# Patient Record
Sex: FEMALE | Race: BLACK OR AFRICAN AMERICAN | NOT HISPANIC OR LATINO | Employment: OTHER | URBAN - METROPOLITAN AREA
[De-identification: names, ages, dates, MRNs, and addresses within clinical notes are randomized per-mention and may not be internally consistent; named-entity substitution may affect disease eponyms.]

---

## 2023-01-26 PROBLEM — M75.22 BICEPS TENDINITIS OF LEFT UPPER EXTREMITY: Status: ACTIVE | Noted: 2023-01-26

## 2023-01-26 PROBLEM — G62.9 PERIPHERAL NEUROPATHY: Status: ACTIVE | Noted: 2023-01-26

## 2023-01-26 PROBLEM — J06.9 URI WITH COUGH AND CONGESTION: Status: ACTIVE | Noted: 2023-01-26

## 2023-01-26 PROBLEM — R26.89 IMBALANCE: Status: ACTIVE | Noted: 2023-01-26

## 2023-01-26 PROBLEM — I10 HTN (HYPERTENSION): Status: ACTIVE | Noted: 2023-01-26

## 2023-01-26 PROBLEM — I99.8 ISCHEMIC LEG: Status: ACTIVE | Noted: 2023-01-26

## 2023-01-26 PROBLEM — M70.62 TROCHANTERIC BURSITIS OF LEFT HIP: Status: ACTIVE | Noted: 2023-01-26

## 2023-01-26 PROBLEM — H25.813 COMBINED FORM OF AGE-RELATED CATARACT, BOTH EYES: Status: ACTIVE | Noted: 2023-01-26

## 2023-01-26 PROBLEM — A60.04 HERPES SIMPLEX VULVOVAGINITIS: Status: ACTIVE | Noted: 2023-01-26

## 2023-01-26 PROBLEM — J06.9 ACUTE URI: Status: ACTIVE | Noted: 2023-01-26

## 2023-01-26 PROBLEM — L84 PLANTAR CALLUS: Status: ACTIVE | Noted: 2023-01-26

## 2023-01-26 PROBLEM — N92.0 SPOTTING: Status: ACTIVE | Noted: 2023-01-26

## 2023-01-26 PROBLEM — N95.0 POST-MENOPAUSAL BLEEDING: Status: ACTIVE | Noted: 2023-01-26

## 2023-01-26 PROBLEM — I63.9 CEREBELLAR INFARCTION (MULTI): Status: ACTIVE | Noted: 2023-01-26

## 2023-01-26 PROBLEM — B35.1 ONYCHOMYCOSIS: Status: ACTIVE | Noted: 2023-01-26

## 2023-01-26 PROBLEM — M54.12 CERVICAL RADICULAR PAIN: Status: ACTIVE | Noted: 2023-01-26

## 2023-01-26 PROBLEM — E66.9 CLASS 1 OBESITY WITH SERIOUS COMORBIDITY AND BODY MASS INDEX (BMI) OF 33.0 TO 33.9 IN ADULT: Status: ACTIVE | Noted: 2023-01-26

## 2023-01-26 PROBLEM — E11.9 DIABETES MELLITUS TYPE 2 WITHOUT RETINOPATHY (MULTI): Status: ACTIVE | Noted: 2023-01-26

## 2023-01-26 PROBLEM — E78.5 HYPERLIPIDEMIA: Status: ACTIVE | Noted: 2023-01-26

## 2023-01-26 PROBLEM — H52.03 HYPERMETROPIA OF BOTH EYES: Status: ACTIVE | Noted: 2023-01-26

## 2023-01-26 PROBLEM — E11.42 DIABETIC PERIPHERAL NEUROPATHY (MULTI): Status: ACTIVE | Noted: 2023-01-26

## 2023-01-26 PROBLEM — R10.2 PELVIC CRAMPING: Status: ACTIVE | Noted: 2023-01-26

## 2023-01-26 PROBLEM — R63.4 WEIGHT LOSS, ABNORMAL: Status: ACTIVE | Noted: 2023-01-26

## 2023-01-26 PROBLEM — B96.89 BACTERIAL VAGINOSIS: Status: ACTIVE | Noted: 2023-01-26

## 2023-01-26 PROBLEM — E11.65 UNCONTROLLED TYPE 2 DIABETES MELLITUS WITH HYPERGLYCEMIA (MULTI): Status: ACTIVE | Noted: 2023-01-26

## 2023-01-26 PROBLEM — E66.811 CLASS 1 OBESITY WITH SERIOUS COMORBIDITY AND BODY MASS INDEX (BMI) OF 33.0 TO 33.9 IN ADULT: Status: ACTIVE | Noted: 2023-01-26

## 2023-01-26 PROBLEM — L84 PRE-ULCERATIVE CALLUSES: Status: ACTIVE | Noted: 2023-01-26

## 2023-01-26 PROBLEM — I73.9 PVD (PERIPHERAL VASCULAR DISEASE) (CMS-HCC): Status: ACTIVE | Noted: 2023-01-26

## 2023-01-26 PROBLEM — D73.5 SPLENIC INFARCTION: Status: ACTIVE | Noted: 2023-01-26

## 2023-01-26 PROBLEM — N76.0 BACTERIAL VAGINOSIS: Status: ACTIVE | Noted: 2023-01-26

## 2023-01-26 PROBLEM — K21.9 GERD (GASTROESOPHAGEAL REFLUX DISEASE): Status: ACTIVE | Noted: 2023-01-26

## 2023-01-26 PROBLEM — L60.3 NAIL DYSTROPHY: Status: ACTIVE | Noted: 2023-01-26

## 2023-01-26 PROBLEM — H52.203 ASTIGMATISM OF BOTH EYES: Status: ACTIVE | Noted: 2023-01-26

## 2023-01-26 RX ORDER — INSULIN GLARGINE 100 [IU]/ML
22 INJECTION, SOLUTION SUBCUTANEOUS NIGHTLY
COMMUNITY
End: 2023-11-02 | Stop reason: SDUPTHER

## 2023-01-26 RX ORDER — AMLODIPINE BESYLATE 10 MG/1
1 TABLET ORAL DAILY
COMMUNITY
Start: 2018-06-21 | End: 2023-09-14 | Stop reason: SDUPTHER

## 2023-01-26 RX ORDER — GLIPIZIDE 10 MG/1
1 TABLET ORAL 2 TIMES DAILY
COMMUNITY
Start: 2019-10-31 | End: 2023-03-14

## 2023-01-26 RX ORDER — DEXTROMETHORPHAN HYDROBROMIDE, GUAIFENESIN 5; 100 MG/5ML; MG/5ML
1 LIQUID ORAL 4 TIMES DAILY PRN
COMMUNITY
Start: 2020-09-17 | End: 2023-09-14 | Stop reason: ALTCHOICE

## 2023-01-26 RX ORDER — ATORVASTATIN CALCIUM 80 MG/1
1 TABLET, FILM COATED ORAL DAILY
COMMUNITY
Start: 2018-06-21 | End: 2024-02-22 | Stop reason: SDUPTHER

## 2023-01-26 RX ORDER — HYDRALAZINE HYDROCHLORIDE 50 MG/1
1 TABLET, FILM COATED ORAL EVERY 8 HOURS
COMMUNITY
Start: 2018-06-21 | End: 2024-02-08 | Stop reason: WASHOUT

## 2023-01-26 RX ORDER — PEN NEEDLE, DIABETIC 30 GX3/16"
NEEDLE, DISPOSABLE MISCELLANEOUS DAILY
COMMUNITY
End: 2024-02-08 | Stop reason: SDUPTHER

## 2023-01-26 RX ORDER — CLOPIDOGREL BISULFATE 75 MG/1
1 TABLET ORAL DAILY
COMMUNITY
Start: 2019-10-29 | End: 2023-08-18

## 2023-01-26 RX ORDER — LISINOPRIL 20 MG/1
1 TABLET ORAL DAILY
COMMUNITY
Start: 2018-06-21 | End: 2024-02-08 | Stop reason: SDUPTHER

## 2023-01-26 RX ORDER — CHLORTHALIDONE 25 MG/1
12.5 TABLET ORAL DAILY
COMMUNITY
Start: 2018-06-28 | End: 2023-06-02 | Stop reason: SDUPTHER

## 2023-01-26 RX ORDER — ASPIRIN 81 MG/1
1 TABLET ORAL DAILY
COMMUNITY

## 2023-03-14 DIAGNOSIS — E11.9 TYPE 2 DIABETES MELLITUS WITHOUT COMPLICATIONS (MULTI): ICD-10-CM

## 2023-03-14 RX ORDER — GLIPIZIDE 10 MG/1
TABLET ORAL
Qty: 60 TABLET | Refills: 1 | Status: SHIPPED | OUTPATIENT
Start: 2023-03-14 | End: 2023-06-04

## 2023-06-01 DIAGNOSIS — E11.9 TYPE 2 DIABETES MELLITUS WITHOUT COMPLICATIONS (MULTI): ICD-10-CM

## 2023-06-01 DIAGNOSIS — I10 HYPERTENSION, UNSPECIFIED TYPE: Primary | ICD-10-CM

## 2023-06-04 RX ORDER — CHLORTHALIDONE 25 MG/1
12.5 TABLET ORAL DAILY
Qty: 45 TABLET | Refills: 1 | Status: SHIPPED | OUTPATIENT
Start: 2023-06-04 | End: 2023-09-06

## 2023-06-04 RX ORDER — GLIPIZIDE 10 MG/1
TABLET ORAL
Qty: 60 TABLET | Refills: 1 | Status: SHIPPED | OUTPATIENT
Start: 2023-06-04 | End: 2023-08-18

## 2023-08-13 DIAGNOSIS — E11.9 TYPE 2 DIABETES MELLITUS WITHOUT COMPLICATIONS (MULTI): ICD-10-CM

## 2023-08-13 DIAGNOSIS — I63.9 CEREBRAL INFARCTION, UNSPECIFIED (MULTI): ICD-10-CM

## 2023-08-18 RX ORDER — CLOPIDOGREL BISULFATE 75 MG/1
75 TABLET ORAL DAILY
Qty: 90 TABLET | Refills: 0 | Status: SHIPPED | OUTPATIENT
Start: 2023-08-18 | End: 2023-11-10

## 2023-08-18 RX ORDER — GLIPIZIDE 10 MG/1
TABLET ORAL
Qty: 60 TABLET | Refills: 0 | Status: SHIPPED | OUTPATIENT
Start: 2023-08-18 | End: 2023-09-08

## 2023-09-05 DIAGNOSIS — I10 HYPERTENSION, UNSPECIFIED TYPE: ICD-10-CM

## 2023-09-06 RX ORDER — CHLORTHALIDONE 25 MG/1
12.5 TABLET ORAL DAILY
Qty: 30 TABLET | Refills: 0 | Status: SHIPPED | OUTPATIENT
Start: 2023-09-06 | End: 2023-10-09 | Stop reason: SDUPTHER

## 2023-09-08 DIAGNOSIS — E11.9 TYPE 2 DIABETES MELLITUS WITHOUT COMPLICATIONS (MULTI): ICD-10-CM

## 2023-09-08 RX ORDER — GLIPIZIDE 10 MG/1
TABLET ORAL
Qty: 60 TABLET | Refills: 0 | Status: SHIPPED | OUTPATIENT
Start: 2023-09-08 | End: 2023-10-09

## 2023-09-14 ENCOUNTER — LAB (OUTPATIENT)
Dept: LAB | Facility: LAB | Age: 68
End: 2023-09-14
Payer: MEDICARE

## 2023-09-14 ENCOUNTER — OFFICE VISIT (OUTPATIENT)
Dept: PRIMARY CARE | Facility: CLINIC | Age: 68
End: 2023-09-14
Payer: MEDICARE

## 2023-09-14 VITALS
TEMPERATURE: 98.1 F | WEIGHT: 179.4 LBS | HEART RATE: 67 BPM | RESPIRATION RATE: 17 BRPM | SYSTOLIC BLOOD PRESSURE: 140 MMHG | HEIGHT: 67 IN | DIASTOLIC BLOOD PRESSURE: 76 MMHG | OXYGEN SATURATION: 100 % | BODY MASS INDEX: 28.16 KG/M2

## 2023-09-14 DIAGNOSIS — I10 HYPERTENSION, UNSPECIFIED TYPE: ICD-10-CM

## 2023-09-14 DIAGNOSIS — E78.5 HYPERLIPIDEMIA, UNSPECIFIED HYPERLIPIDEMIA TYPE: ICD-10-CM

## 2023-09-14 DIAGNOSIS — N18.31 TYPE 2 DIABETES MELLITUS WITH STAGE 3A CHRONIC KIDNEY DISEASE, WITH LONG-TERM CURRENT USE OF INSULIN (MULTI): Chronic | ICD-10-CM

## 2023-09-14 DIAGNOSIS — E78.5 HYPERLIPIDEMIA, UNSPECIFIED HYPERLIPIDEMIA TYPE: Primary | ICD-10-CM

## 2023-09-14 DIAGNOSIS — Z79.4 TYPE 2 DIABETES MELLITUS WITH STAGE 3A CHRONIC KIDNEY DISEASE, WITH LONG-TERM CURRENT USE OF INSULIN (MULTI): Chronic | ICD-10-CM

## 2023-09-14 DIAGNOSIS — E11.22 TYPE 2 DIABETES MELLITUS WITH STAGE 3A CHRONIC KIDNEY DISEASE, WITH LONG-TERM CURRENT USE OF INSULIN (MULTI): Chronic | ICD-10-CM

## 2023-09-14 PROBLEM — D48.5 NEOPLASM OF UNCERTAIN BEHAVIOR OF SKIN: Status: ACTIVE | Noted: 2022-02-17

## 2023-09-14 PROBLEM — L85.3 XEROSIS CUTIS: Status: ACTIVE | Noted: 2022-02-17

## 2023-09-14 PROBLEM — L85.9 EPIDERMAL THICKENING, UNSPECIFIED: Status: ACTIVE | Noted: 2022-02-17

## 2023-09-14 LAB
ALBUMIN (MG/L) IN URINE: 17.4 MG/L
ALBUMIN/CREATININE (UG/MG) IN URINE: 26.7 UG/MG CRT (ref 0–30)
ANION GAP IN SER/PLAS: 13 MMOL/L (ref 10–20)
CALCIUM (MG/DL) IN SER/PLAS: 9.5 MG/DL (ref 8.6–10.6)
CARBON DIOXIDE, TOTAL (MMOL/L) IN SER/PLAS: 29 MMOL/L (ref 21–32)
CHLORIDE (MMOL/L) IN SER/PLAS: 105 MMOL/L (ref 98–107)
CHOLESTEROL (MG/DL) IN SER/PLAS: 179 MG/DL (ref 0–199)
CHOLESTEROL IN HDL (MG/DL) IN SER/PLAS: 51.5 MG/DL
CHOLESTEROL IN LDL (MG/DL) IN SER/PLAS BY DIRECT ASSAY: 114 MG/DL (ref 0–129)
CHOLESTEROL/HDL RATIO: 3.5
CREATININE (MG/DL) IN SER/PLAS: 1.12 MG/DL (ref 0.5–1.05)
CREATININE (MG/DL) IN URINE: 65.2 MG/DL (ref 20–320)
ESTIMATED AVERAGE GLUCOSE FOR HBA1C: 226 MG/DL
GFR FEMALE: 54 ML/MIN/1.73M2
GLUCOSE (MG/DL) IN SER/PLAS: 256 MG/DL (ref 74–99)
HEMOGLOBIN A1C/HEMOGLOBIN TOTAL IN BLOOD: 9.5 %
NON-HDL CHOLESTEROL: 128 MG/DL
POTASSIUM (MMOL/L) IN SER/PLAS: 3.9 MMOL/L (ref 3.5–5.3)
SODIUM (MMOL/L) IN SER/PLAS: 143 MMOL/L (ref 136–145)
UREA NITROGEN (MG/DL) IN SER/PLAS: 31 MG/DL (ref 6–23)

## 2023-09-14 PROCEDURE — 82465 ASSAY BLD/SERUM CHOLESTEROL: CPT

## 2023-09-14 PROCEDURE — 4010F ACE/ARB THERAPY RXD/TAKEN: CPT | Performed by: INTERNAL MEDICINE

## 2023-09-14 PROCEDURE — 82043 UR ALBUMIN QUANTITATIVE: CPT

## 2023-09-14 PROCEDURE — 36415 COLL VENOUS BLD VENIPUNCTURE: CPT

## 2023-09-14 PROCEDURE — 80048 BASIC METABOLIC PNL TOTAL CA: CPT

## 2023-09-14 PROCEDURE — 3078F DIAST BP <80 MM HG: CPT | Performed by: INTERNAL MEDICINE

## 2023-09-14 PROCEDURE — 83036 HEMOGLOBIN GLYCOSYLATED A1C: CPT

## 2023-09-14 PROCEDURE — 3077F SYST BP >= 140 MM HG: CPT | Performed by: INTERNAL MEDICINE

## 2023-09-14 PROCEDURE — 83721 ASSAY OF BLOOD LIPOPROTEIN: CPT

## 2023-09-14 PROCEDURE — 82570 ASSAY OF URINE CREATININE: CPT

## 2023-09-14 PROCEDURE — 99214 OFFICE O/P EST MOD 30 MIN: CPT | Performed by: INTERNAL MEDICINE

## 2023-09-14 PROCEDURE — 1159F MED LIST DOCD IN RCRD: CPT | Performed by: INTERNAL MEDICINE

## 2023-09-14 PROCEDURE — 1036F TOBACCO NON-USER: CPT | Performed by: INTERNAL MEDICINE

## 2023-09-14 PROCEDURE — 83718 ASSAY OF LIPOPROTEIN: CPT

## 2023-09-14 PROCEDURE — 1160F RVW MEDS BY RX/DR IN RCRD: CPT | Performed by: INTERNAL MEDICINE

## 2023-09-14 PROCEDURE — 1126F AMNT PAIN NOTED NONE PRSNT: CPT | Performed by: INTERNAL MEDICINE

## 2023-09-14 RX ORDER — LISINOPRIL AND HYDROCHLOROTHIAZIDE 20; 25 MG/1; MG/1
2 TABLET ORAL DAILY
COMMUNITY
Start: 2015-06-19 | End: 2023-09-14 | Stop reason: ALTCHOICE

## 2023-09-14 RX ORDER — AMLODIPINE BESYLATE 10 MG/1
10 TABLET ORAL DAILY
Qty: 90 TABLET | Refills: 0 | Status: SHIPPED | OUTPATIENT
Start: 2023-09-14 | End: 2024-01-02

## 2023-09-14 RX ORDER — AMLODIPINE BESYLATE 10 MG/1
10 TABLET ORAL DAILY
Qty: 90 TABLET | Refills: 0 | Status: CANCELLED | OUTPATIENT
Start: 2023-09-14 | End: 2023-12-13

## 2023-09-14 ASSESSMENT — PATIENT HEALTH QUESTIONNAIRE - PHQ9
2. FEELING DOWN, DEPRESSED OR HOPELESS: NOT AT ALL
1. LITTLE INTEREST OR PLEASURE IN DOING THINGS: NOT AT ALL
SUM OF ALL RESPONSES TO PHQ9 QUESTIONS 1 & 2: 0

## 2023-09-14 ASSESSMENT — LIFESTYLE VARIABLES
AUDIT-C TOTAL SCORE: 0
HOW OFTEN DO YOU HAVE SIX OR MORE DRINKS ON ONE OCCASION: NEVER
HOW MANY STANDARD DRINKS CONTAINING ALCOHOL DO YOU HAVE ON A TYPICAL DAY: PATIENT DOES NOT DRINK
SKIP TO QUESTIONS 9-10: 1
HOW OFTEN DO YOU HAVE A DRINK CONTAINING ALCOHOL: NEVER

## 2023-09-14 ASSESSMENT — ENCOUNTER SYMPTOMS
SHORTNESS OF BREATH: 0
RHINORRHEA: 0
COUGH: 0
DIZZINESS: 0
DYSURIA: 0
NAUSEA: 0
FEVER: 0
EYES NEGATIVE: 1
HEADACHES: 0
VOMITING: 0
CHILLS: 0
NUMBNESS: 0
ABDOMINAL PAIN: 0
POLYPHAGIA: 0
DIARRHEA: 0
WHEEZING: 0
SORE THROAT: 0
POLYDIPSIA: 0

## 2023-09-14 NOTE — PATIENT INSTRUCTIONS
Assessment/Plan     Tammy was seen today for diabetes.  Diagnoses and all orders for this visit:  Hyperlipidemia, unspecified hyperlipidemia type (Primary)  Comments:  Most recent LDL cholesterol was 119mg/dl. Goal is LDL cholesterol<100mg/dl.  Plan: Continue statin [place]  -Low cholesterol diet  Orders:  -     Cholesterol, LDL Direct; Future  -     Lipid Panel Non-Fasting; Future  Hypertension, unspecified type  Comments:  BP today is 140/76. Goal is BP<130/80.Has been without Amlodipine for the past 4 days.   Plan: take all medeications as previously prescribed  Orders:  -     Basic Metabolic Panel; Future  -     amLODIPine (Norvasc) 10 mg tablet; Take 1 tablet (10 mg) by mouth once daily.  Type 2 diabetes mellitus with stage 3a chronic kidney disease, with long-term current use of insulin (CMS/[facility])  Comments:  Non-compliance treatment.  Has not taken insulin in >two months.  plan: Resume inulin as prescribed  -Take all other medications as prescribed  Orders:  -     Albumin , Urine Random; Future  -     Basic Metabolic Panel; Future  -     Hemoglobin A1C; Future  -     Follow Up In [facility] - Pharmacy; Future     F/U in 2mths for DM and HTN

## 2023-09-14 NOTE — PROGRESS NOTES
Subjective   Patient ID: Tammy Becerra is a 68 y.o. female who presents for Diabetes.  The patient is a 68-year-old female who is being seen today for hypertension, diabetes and hyperlipidemia.  She states that she has not taken her insulin in the past two months.  She does have the insulin at home but she decided not to take it.  She states that her fasting blood sugar has been elevated at x2 in the 200s and occasionally her random blood sugar has been in the 300s.  She has been noncompliant with both her medications and her diet.        Review of Systems   Constitutional:  Negative for chills and fever.   HENT:  Negative for rhinorrhea and sore throat.    Eyes: Negative.    Respiratory:  Negative for cough, shortness of breath and wheezing.    Cardiovascular:  Negative for chest pain and leg swelling.   Gastrointestinal:  Negative for abdominal pain, diarrhea, nausea and vomiting.   Endocrine: Negative for polydipsia, polyphagia and polyuria.   Genitourinary:  Negative for dysuria.   Neurological:  Negative for dizziness, syncope, numbness and headaches.       Objective   Physical Exam  Vitals reviewed.   Constitutional:       General: She is not in acute distress.     Appearance: She is not ill-appearing.   Cardiovascular:      Rate and Rhythm: Normal rate and regular rhythm.      Heart sounds: Normal heart sounds.   Pulmonary:      Effort: Pulmonary effort is normal.      Breath sounds: Normal breath sounds.   Abdominal:      General: Bowel sounds are normal.      Palpations: Abdomen is soft.   Musculoskeletal:      Cervical back: No tenderness.      Right lower leg: No edema.      Left lower leg: No edema.   Lymphadenopathy:      Cervical: No cervical adenopathy.   Skin:     General: Skin is warm and dry.   Neurological:      Mental Status: She is alert and oriented to person, place, and time.      Sensory: No sensory deficit.   Psychiatric:         Mood and Affect: Mood normal.         Assessment/Plan      Tammy was seen today for diabetes.  Diagnoses and all orders for this visit:  Hyperlipidemia, unspecified hyperlipidemia type (Primary)  Comments:  Most recent LDL cholesterol was 119mg/dl. Goal is LDL cholesterol<100mg/dl.  Plan: Continue statin [place]  -Low cholesterol diet  Orders:  -     Cholesterol, LDL Direct; Future  -     Lipid Panel Non-Fasting; Future  Hypertension, unspecified type  Comments:  BP today is 140/76. Goal is BP<130/80.Has been without Amlodipine for the past 4 days.   Plan: take all medeications as previously prescribed  Orders:  -     Basic Metabolic Panel; Future  -     amLODIPine (Norvasc) 10 mg tablet; Take 1 tablet (10 mg) by mouth once daily.  Type 2 diabetes mellitus with stage 3a chronic kidney disease, with long-term current use of insulin (CMS/[facility])  Comments:  Non-compliance treatment.  Has not taken insulin in >two months.  plan: Resume inulin as prescribed  -Take all other medications as prescribed  Orders:  -     Albumin , Urine Random; Future  -     Basic Metabolic Panel; Future  -     Hemoglobin A1C; Future  -     Follow Up In [facility] Pharmacy; Future     F/U in 2mths for DM and HTN

## 2023-10-08 DIAGNOSIS — I10 HYPERTENSION, UNSPECIFIED TYPE: ICD-10-CM

## 2023-10-08 DIAGNOSIS — E11.9 TYPE 2 DIABETES MELLITUS WITHOUT COMPLICATIONS (MULTI): ICD-10-CM

## 2023-10-09 RX ORDER — CHLORTHALIDONE 25 MG/1
12.5 TABLET ORAL DAILY
Qty: 45 TABLET | Refills: 0 | Status: SHIPPED | OUTPATIENT
Start: 2023-10-09 | End: 2023-11-10

## 2023-10-09 RX ORDER — GLIPIZIDE 10 MG/1
TABLET ORAL
Qty: 60 TABLET | Refills: 0 | Status: SHIPPED | OUTPATIENT
Start: 2023-10-09 | End: 2023-11-10

## 2023-10-10 ENCOUNTER — TELEPHONE (OUTPATIENT)
Dept: PHARMACY | Facility: HOSPITAL | Age: 68
End: 2023-10-10

## 2023-10-10 NOTE — TELEPHONE ENCOUNTER
Population Health: Outreach by Ambulatory Pharmacy Team    Payor: DORIAN JOHNSON  Reason: Adherence  Medication: Lisinopril 20 mg, Glipizide 10 mg  Outcome: No Answer/Invalid Number Left message with callback number.    BETHANY GOMEZ CPhT

## 2023-10-17 ENCOUNTER — TELEMEDICINE (OUTPATIENT)
Dept: PHARMACY | Facility: HOSPITAL | Age: 68
End: 2023-10-17
Payer: MEDICARE

## 2023-10-17 DIAGNOSIS — N18.31 TYPE 2 DIABETES MELLITUS WITH STAGE 3A CHRONIC KIDNEY DISEASE, WITH LONG-TERM CURRENT USE OF INSULIN (MULTI): Chronic | ICD-10-CM

## 2023-10-17 DIAGNOSIS — E11.22 TYPE 2 DIABETES MELLITUS WITH STAGE 3A CHRONIC KIDNEY DISEASE, WITH LONG-TERM CURRENT USE OF INSULIN (MULTI): Chronic | ICD-10-CM

## 2023-10-17 DIAGNOSIS — Z79.4 TYPE 2 DIABETES MELLITUS WITH STAGE 3A CHRONIC KIDNEY DISEASE, WITH LONG-TERM CURRENT USE OF INSULIN (MULTI): Chronic | ICD-10-CM

## 2023-10-17 ASSESSMENT — ENCOUNTER SYMPTOMS
HEADACHES: 1
DIZZINESS: 1
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
## 2023-10-17 NOTE — ASSESSMENT & PLAN NOTE
ASSESSMENT:  -Patient's most recent A1c is uncontrolled at 9.5%; above goal of <7.5% due to age  -Discussed that patient's sugars are elevated in the 200s consistently which can lead to feeling hypoglycemic once sugars are <200, even if this is not a true low  -Due to patient's acid reflux, would not be ideal to add on medications that can upset the stomach at this time (i.e metformin, GLP1-Jenna). Discussed trying medications OTC such as a PPI and/or Pepcid in the mornings.  -Patient has a history of non-compliance and recently restarted her insulin after being without for >2 months. Stressed importance of compliance to get blood sugars better under control. Unwilling to try other medications at this time.     RECOMMENDATIONS/PLAN:    INCREASE Lantus to 25 units at bedtime.   CONTINUE Glipizide 10 mg BID.   Advised patient to try Prilosec or Nexium OTC in the mornings on an empty stomach to see if this helps with her acid reflux.     FUTURE CONSIDERATIONS:    Can see if consolidation with BP medications helps with compliance  Switching Lisinopril & Chlorthalidone to Lisinopril/Hydrochlorothiazide combination pill  If diabetes medication is added, can consider SGLT2 inhibitor to replace Glipizide as this does not upset stomach and can also help with hypertension  Can also consider combining this with Metformin using combination pill Synjardy if patient is willing

## 2023-10-18 ENCOUNTER — APPOINTMENT (OUTPATIENT)
Dept: PRIMARY CARE | Facility: CLINIC | Age: 68
End: 2023-10-18
Payer: MEDICARE

## 2023-10-25 ENCOUNTER — OFFICE VISIT (OUTPATIENT)
Dept: VASCULAR SURGERY | Facility: CLINIC | Age: 68
End: 2023-10-25
Payer: MEDICARE

## 2023-10-25 ENCOUNTER — HOSPITAL ENCOUNTER (OUTPATIENT)
Dept: VASCULAR MEDICINE | Facility: CLINIC | Age: 68
Discharge: HOME | End: 2023-10-25
Payer: MEDICARE

## 2023-10-25 VITALS
DIASTOLIC BLOOD PRESSURE: 90 MMHG | WEIGHT: 180.6 LBS | HEART RATE: 94 BPM | SYSTOLIC BLOOD PRESSURE: 170 MMHG | BODY MASS INDEX: 28.35 KG/M2 | HEIGHT: 67 IN

## 2023-10-25 DIAGNOSIS — I73.9 PVD (PERIPHERAL VASCULAR DISEASE) (CMS-HCC): ICD-10-CM

## 2023-10-25 DIAGNOSIS — I73.89 OTHER SPECIFIED PERIPHERAL VASCULAR DISEASES (CMS-HCC): ICD-10-CM

## 2023-10-25 PROCEDURE — 93922 UPR/L XTREMITY ART 2 LEVELS: CPT | Performed by: SURGERY

## 2023-10-25 PROCEDURE — 93922 UPR/L XTREMITY ART 2 LEVELS: CPT

## 2023-11-02 DIAGNOSIS — E11.9 DIABETES MELLITUS TYPE 2 WITHOUT RETINOPATHY (MULTI): ICD-10-CM

## 2023-11-02 RX ORDER — INSULIN GLARGINE 100 [IU]/ML
22 INJECTION, SOLUTION SUBCUTANEOUS NIGHTLY
Qty: 10 ML | Refills: 0 | Status: SHIPPED | OUTPATIENT
Start: 2023-11-02 | End: 2023-11-03 | Stop reason: ENTERED-IN-ERROR

## 2023-11-03 DIAGNOSIS — E11.9 DIABETES MELLITUS TYPE 2 WITHOUT RETINOPATHY (MULTI): ICD-10-CM

## 2023-11-03 RX ORDER — INSULIN GLARGINE 100 [IU]/ML
INJECTION, SOLUTION SUBCUTANEOUS
Qty: 3 EACH | Refills: 0 | Status: SHIPPED | OUTPATIENT
Start: 2023-11-03 | End: 2024-01-04 | Stop reason: SDUPTHER

## 2023-11-03 RX ORDER — INSULIN GLARGINE 100 [IU]/ML
INJECTION, SOLUTION SUBCUTANEOUS
COMMUNITY
Start: 2023-10-18 | End: 2023-11-03 | Stop reason: SDUPTHER

## 2023-11-03 NOTE — TELEPHONE ENCOUNTER
Patient called in to the office to ask about lantus. The insulin that was sent to the pharmacy was incorrect. This is the correct Lantus pen that needs to be sent to the pharmacy.

## 2023-11-09 DIAGNOSIS — E11.9 TYPE 2 DIABETES MELLITUS WITHOUT COMPLICATIONS (MULTI): ICD-10-CM

## 2023-11-09 DIAGNOSIS — I63.9 CEREBRAL INFARCTION, UNSPECIFIED (MULTI): ICD-10-CM

## 2023-11-09 DIAGNOSIS — I10 HYPERTENSION, UNSPECIFIED TYPE: ICD-10-CM

## 2023-11-10 RX ORDER — CHLORTHALIDONE 25 MG/1
12.5 TABLET ORAL DAILY
Qty: 45 TABLET | Refills: 0 | Status: SHIPPED | OUTPATIENT
Start: 2023-11-10 | End: 2024-02-08 | Stop reason: SDUPTHER

## 2023-11-10 RX ORDER — CLOPIDOGREL BISULFATE 75 MG/1
75 TABLET ORAL DAILY
Qty: 90 TABLET | Refills: 0 | Status: SHIPPED | OUTPATIENT
Start: 2023-11-10

## 2023-11-10 RX ORDER — GLIPIZIDE 10 MG/1
TABLET ORAL
Qty: 60 TABLET | Refills: 0 | Status: SHIPPED | OUTPATIENT
Start: 2023-11-10 | End: 2023-12-29

## 2023-11-17 ENCOUNTER — TELEMEDICINE (OUTPATIENT)
Dept: PHARMACY | Facility: HOSPITAL | Age: 68
End: 2023-11-17
Payer: MEDICARE

## 2023-11-17 DIAGNOSIS — E11.9 DIABETES MELLITUS TYPE 2 WITHOUT RETINOPATHY (MULTI): ICD-10-CM

## 2023-11-17 DIAGNOSIS — E11.9 DIABETES MELLITUS TYPE 2 WITHOUT RETINOPATHY (MULTI): Primary | ICD-10-CM

## 2023-11-17 NOTE — PROGRESS NOTES
Subjective   Patient ID: Tammy Becerra is a 68 y.o. female who presents for Diabetes.    Referring Provider: Lorraine Piña MD     Patient presents to Clinical Pharmacy team for follow up after last visit on 10/17/23. Patient saw PCP back in September and was restarted on her Lantus after not taking it for >2 months. Patient's A1c during office visit was 9.5%, which was decreased from last A1c back in December of last year at 10.6%.      Patient usually measures her blood sugars in the mornings between 9-10 AM and in the evening between 7-8 PM. Reports BG in the mornings being around the 200s; these are not fasting readings as patient usually has some crackers and tea (without sugar) in the morning due to her acid reflux. Patient described being unable to do anything in the mornings for ~3 hours due to her acid reflux, which started after her stroke in 2019.       BG in the evenings are usually lower per patient, as she does not each much during the day-reports around the mid 100s. Denies any recent readings <100 mg/dL, though notes that she has hypoglycemia symptoms (dizziness/headaches) when her BG drops below 200.      Of note, patient is overwhelmed with current number of medications and is non-compliant as a result. Believes that her acid reflux makes this worse as it is hard to keep pills down until it resolves. Is unwilling to add on more medications at this time.     HISTORICAL MEDICATIONS:  -Metformin (stopped d/t GI issues)  -Trulicity (stopped d/t GI issues)      Objective     There were no vitals taken for this visit.     Labs  Lab Results   Component Value Date    BILITOT 0.7 04/25/2022    CALCIUM 9.5 09/14/2023    CO2 29 09/14/2023     09/14/2023    CREATININE 1.12 (H) 09/14/2023    GLUCOSE 256 (H) 09/14/2023    ALKPHOS 99 04/25/2022    K 3.9 09/14/2023    PROT 8.4 (H) 04/25/2022     09/14/2023    AST 13 04/25/2022    ALT 10 04/25/2022    BUN 31 (H) 09/14/2023    ANIONGAP 13 09/14/2023     "MG 1.90 04/26/2022    PHOS 3.6 12/11/2019     10/01/2018    ALBUMIN 4.7 04/25/2022    LIPASE 28 04/25/2022    GFRF 54 (A) 09/14/2023     Lab Results   Component Value Date    TRIG 196 (H) 04/26/2022    CHOL 179 09/14/2023    HDL 51.5 09/14/2023     Lab Results   Component Value Date    HGBA1C 9.5 (A) 09/14/2023       Current Outpatient Medications on File Prior to Visit   Medication Sig Dispense Refill    amLODIPine (Norvasc) 10 mg tablet Take 1 tablet (10 mg) by mouth once daily. 90 tablet 0    aspirin 81 mg EC tablet Take 1 tablet (81 mg) by mouth once daily.      atorvastatin (Lipitor) 80 mg tablet Take 1 tablet (80 mg) by mouth once daily.      chlorthalidone (Hygroton) 25 mg tablet TAKE 1/2 (ONE-HALF) OF A TABLET BY MOUTH ONCE DAILY 45 tablet 0    clopidogrel (Plavix) 75 mg tablet TAKE 1 TABLET BY MOUTH ONCE DAILY 90 tablet 0    glipiZIDE (Glucotrol) 10 mg tablet TAKE 1 TABLET BY MOUTH TWICE DAILY 60 tablet 0    hydrALAZINE (Apresoline) 50 mg tablet Take 1 tablet (50 mg) by mouth every 8 hours.      Lantus Solostar U-100 Insulin 100 unit/mL (3 mL) pen Use 22units nightly 3 each 0    lisinopril 20 mg tablet Take 1 tablet (20 mg) by mouth once daily.      pen needle, diabetic 32 gauge x 5/32\" needle 1 (one) time each day.       No current facility-administered medications on file prior to visit.        Assessment/Plan   Problem List Items Addressed This Visit             ICD-10-CM    Diabetes mellitus type 2 without retinopathy (CMS/[facility]) E11.9     ASSESSMENT:  -Patient's most recent A1c is uncontrolled at 9.5%; above goal of <7.5% due to age  -Discussed that patient's sugars are elevated in the 200s consistently which can lead to feeling hypoglycemic once sugars are <200, even if this is not a true low  -Due to patient's acid reflux, would not be ideal to add on medications that can upset the stomach at this time (i.e metformin, GLP1-Jenna). Discussed trying medications OTC such as a PPI and/or Pepcid in the " mornings.  -Patient has a history of non-compliance and recently restarted her insulin after being without for >2 months. Stressed importance of compliance to get blood sugars better under control. Unwilling to try other medications at this time.      RECOMMENDATIONS/PLAN:     INCREASE Lantus to 25 units at bedtime. Was unable to increase after last visit with pharmacy team.  CONTINUE Glipizide 10 mg BID.   Advised patient to try Prilosec or Nexium OTC in the mornings on an empty stomach to see if this helps with her acid reflux.   Patient was not feeling well at time of appointment and discussion was cut short. Will discuss medication options with patient in more depth next week.      FUTURE CONSIDERATIONS:     Can see if consolidation with BP medications helps with compliance  Switching Lisinopril & Chlorthalidone to Lisinopril/Hydrochlorothiazide combination pill  If diabetes medication is added, can consider SGLT2 inhibitor to replace Glipizide as this does not upset stomach and can also help with hypertension  Can also consider combining this with Metformin using combination pill Synjardy if patient is willing          Medina Murphy PharmD  Clinical Ambulatory Care Pharmacist  Ph: [phone]    Continue all meds under the continuation of care with the referring provider and clinical pharmacy team.    Clinical Pharmacist follow up: 11/21/23 or sooner as needed based on clinical intervention  Type of Encounter:  Telephone    Verbal consent to manage patient's drug therapy was obtained from the patient. They were informed they may decline to participate or withdraw from participation in pharmacy services at any time.

## 2023-11-17 NOTE — ASSESSMENT & PLAN NOTE
ASSESSMENT:  -Patient's most recent A1c is uncontrolled at 9.5%; above goal of <7.5% due to age  -Discussed that patient's sugars are elevated in the 200s consistently which can lead to feeling hypoglycemic once sugars are <200, even if this is not a true low  -Due to patient's acid reflux, would not be ideal to add on medications that can upset the stomach at this time (i.e metformin, GLP1-Jenna). Discussed trying medications OTC such as a PPI and/or Pepcid in the mornings.  -Patient has a history of non-compliance and recently restarted her insulin after being without for >2 months. Stressed importance of compliance to get blood sugars better under control. Unwilling to try other medications at this time.      RECOMMENDATIONS/PLAN:     INCREASE Lantus to 25 units at bedtime. Was unable to increase after last visit with pharmacy team.  CONTINUE Glipizide 10 mg BID.   Advised patient to try Prilosec or Nexium OTC in the mornings on an empty stomach to see if this helps with her acid reflux.   Patient was not feeling well at time of appointment and discussion was cut short. Will discuss medication options with patient in more depth next week.      FUTURE CONSIDERATIONS:     Can see if consolidation with BP medications helps with compliance  Switching Lisinopril & Chlorthalidone to Lisinopril/Hydrochlorothiazide combination pill  If diabetes medication is added, can consider SGLT2 inhibitor to replace Glipizide as this does not upset stomach and can also help with hypertension  Can also consider combining this with Metformin using combination pill Synjardy if patient is willing

## 2023-12-19 DIAGNOSIS — I10 HYPERTENSION, UNSPECIFIED TYPE: ICD-10-CM

## 2023-12-29 NOTE — TELEPHONE ENCOUNTER
Patient called in I explained to her about her medication and that the Rx refill needs to be signed off by provider but she needs to get a PCP in the mean time

## 2024-01-02 RX ORDER — AMLODIPINE BESYLATE 10 MG/1
10 TABLET ORAL DAILY
Qty: 90 TABLET | Refills: 0 | Status: SHIPPED | OUTPATIENT
Start: 2024-01-02 | End: 2024-02-05

## 2024-01-04 DIAGNOSIS — E11.9 DIABETES MELLITUS TYPE 2 WITHOUT RETINOPATHY (MULTI): ICD-10-CM

## 2024-01-05 DIAGNOSIS — E11.9 TYPE 2 DIABETES MELLITUS WITHOUT COMPLICATIONS (MULTI): ICD-10-CM

## 2024-01-10 ENCOUNTER — APPOINTMENT (OUTPATIENT)
Dept: VASCULAR SURGERY | Facility: CLINIC | Age: 69
End: 2024-01-10
Payer: MEDICARE

## 2024-01-10 RX ORDER — INSULIN GLARGINE 100 [IU]/ML
25 INJECTION, SOLUTION SUBCUTANEOUS NIGHTLY
Qty: 9 ML | Refills: 0 | Status: SHIPPED | OUTPATIENT
Start: 2024-01-10 | End: 2024-01-31 | Stop reason: SDUPTHER

## 2024-01-24 RX ORDER — GLIPIZIDE 10 MG/1
TABLET ORAL
Qty: 60 TABLET | Refills: 0 | Status: SHIPPED | OUTPATIENT
Start: 2024-01-24 | End: 2024-03-05

## 2024-01-31 DIAGNOSIS — E11.9 DIABETES MELLITUS TYPE 2 WITHOUT RETINOPATHY (MULTI): ICD-10-CM

## 2024-02-01 RX ORDER — INSULIN GLARGINE 100 [IU]/ML
25 INJECTION, SOLUTION SUBCUTANEOUS NIGHTLY
Qty: 9 ML | Refills: 0 | Status: SHIPPED | OUTPATIENT
Start: 2024-02-01 | End: 2024-02-22 | Stop reason: SDUPTHER

## 2024-02-04 DIAGNOSIS — I10 HYPERTENSION, UNSPECIFIED TYPE: ICD-10-CM

## 2024-02-05 RX ORDER — AMLODIPINE BESYLATE 10 MG/1
10 TABLET ORAL DAILY
Qty: 90 TABLET | Refills: 0 | Status: SHIPPED | OUTPATIENT
Start: 2024-02-05 | End: 2024-02-08 | Stop reason: SDUPTHER

## 2024-02-06 DIAGNOSIS — E11.9 TYPE 2 DIABETES MELLITUS WITHOUT COMPLICATIONS (MULTI): ICD-10-CM

## 2024-02-06 RX ORDER — GLIPIZIDE 10 MG/1
TABLET ORAL
Qty: 60 TABLET | Refills: 0 | OUTPATIENT
Start: 2024-02-06

## 2024-02-08 ENCOUNTER — APPOINTMENT (OUTPATIENT)
Dept: LAB | Facility: LAB | Age: 69
End: 2024-02-08
Payer: MEDICARE

## 2024-02-08 ENCOUNTER — OFFICE VISIT (OUTPATIENT)
Dept: PRIMARY CARE | Facility: CLINIC | Age: 69
End: 2024-02-08
Payer: MEDICARE

## 2024-02-08 ENCOUNTER — APPOINTMENT (OUTPATIENT)
Dept: PRIMARY CARE | Facility: CLINIC | Age: 69
End: 2024-02-08
Payer: MEDICARE

## 2024-02-08 VITALS
BODY MASS INDEX: 28.82 KG/M2 | HEART RATE: 82 BPM | DIASTOLIC BLOOD PRESSURE: 90 MMHG | SYSTOLIC BLOOD PRESSURE: 184 MMHG | OXYGEN SATURATION: 98 % | WEIGHT: 184 LBS

## 2024-02-08 DIAGNOSIS — I10 HYPERTENSION, UNSPECIFIED TYPE: ICD-10-CM

## 2024-02-08 DIAGNOSIS — Z12.31 ENCOUNTER FOR SCREENING MAMMOGRAM FOR MALIGNANT NEOPLASM OF BREAST: ICD-10-CM

## 2024-02-08 DIAGNOSIS — E11.9 DIABETES MELLITUS TYPE 2 WITHOUT RETINOPATHY (MULTI): Primary | ICD-10-CM

## 2024-02-08 DIAGNOSIS — Z78.0 MENOPAUSE: ICD-10-CM

## 2024-02-08 PROCEDURE — 4010F ACE/ARB THERAPY RXD/TAKEN: CPT | Performed by: STUDENT IN AN ORGANIZED HEALTH CARE EDUCATION/TRAINING PROGRAM

## 2024-02-08 PROCEDURE — 3050F LDL-C >= 130 MG/DL: CPT | Performed by: STUDENT IN AN ORGANIZED HEALTH CARE EDUCATION/TRAINING PROGRAM

## 2024-02-08 PROCEDURE — 3052F HG A1C>EQUAL 8.0%<EQUAL 9.0%: CPT | Performed by: STUDENT IN AN ORGANIZED HEALTH CARE EDUCATION/TRAINING PROGRAM

## 2024-02-08 PROCEDURE — 3077F SYST BP >= 140 MM HG: CPT | Performed by: STUDENT IN AN ORGANIZED HEALTH CARE EDUCATION/TRAINING PROGRAM

## 2024-02-08 PROCEDURE — 1036F TOBACCO NON-USER: CPT | Performed by: STUDENT IN AN ORGANIZED HEALTH CARE EDUCATION/TRAINING PROGRAM

## 2024-02-08 PROCEDURE — 99204 OFFICE O/P NEW MOD 45 MIN: CPT | Performed by: STUDENT IN AN ORGANIZED HEALTH CARE EDUCATION/TRAINING PROGRAM

## 2024-02-08 PROCEDURE — 3080F DIAST BP >= 90 MM HG: CPT | Performed by: STUDENT IN AN ORGANIZED HEALTH CARE EDUCATION/TRAINING PROGRAM

## 2024-02-08 PROCEDURE — 1126F AMNT PAIN NOTED NONE PRSNT: CPT | Performed by: STUDENT IN AN ORGANIZED HEALTH CARE EDUCATION/TRAINING PROGRAM

## 2024-02-08 RX ORDER — AMLODIPINE BESYLATE 10 MG/1
10 TABLET ORAL DAILY
Qty: 90 TABLET | Refills: 3 | Status: SHIPPED | OUTPATIENT
Start: 2024-02-08

## 2024-02-08 RX ORDER — LISINOPRIL 40 MG/1
40 TABLET ORAL DAILY
Qty: 90 TABLET | Refills: 3 | Status: SHIPPED | OUTPATIENT
Start: 2024-02-08 | End: 2025-02-07

## 2024-02-08 RX ORDER — BLOOD SUGAR DIAGNOSTIC
1 STRIP MISCELLANEOUS 3 TIMES DAILY
Qty: 100 EACH | Refills: 11 | Status: SHIPPED | OUTPATIENT
Start: 2024-02-08 | End: 2025-03-14

## 2024-02-08 RX ORDER — LISINOPRIL 20 MG/1
40 TABLET ORAL DAILY
Qty: 180 TABLET | Refills: 3 | Status: SHIPPED | OUTPATIENT
Start: 2024-02-08 | End: 2024-02-08 | Stop reason: SDUPTHER

## 2024-02-08 RX ORDER — CHLORTHALIDONE 25 MG/1
12.5 TABLET ORAL DAILY
Qty: 45 TABLET | Refills: 0 | Status: SHIPPED | OUTPATIENT
Start: 2024-02-08 | End: 2024-03-28 | Stop reason: SDUPTHER

## 2024-02-08 NOTE — PATIENT INSTRUCTIONS
Thank you for coming today Tammy!    Please get your blood work done. The lab is on floor [address] in [address].     Please schedule your mammogram and bone density screening. You can get this done on the lower level in [address]. The phone number is [phone].     We are going up on your lisinopril to 40mg daily and stopping the hydralazine.     For blood pressure you are taking: amlodipine 10mg, lisinopril 40mg, and chlorthalidone 12.5mg     For your nausea, please start taking omeprazole 20mg daily.    Please follow up in 2 weeks for hypertension and diabetes.  I will see you on February 22nd at 11:40am!

## 2024-02-08 NOTE — PROGRESS NOTES
Subjective   Patient ID: Tammy Becerra is a 68 y.o. female     HPI    69 yo F  with PMH of HTN, poorly controlled T2DM on insulin, HLD, prior CVA in 2019 on DAPT, GERD.     Patient's main complaint is nausea that lasts several hours in the morning. This typically gets better with sitting up. Has previously trailed a PPI which she states improved her symptoms in the past, but they recurred after she stopped these medications.     For her diabetes, she has been more consistent with taking her insulin in last few months. Has noticed weight gain in last few months.     Social History:  Retired  Lives with partner  Rarely exercises   Stopped smoking 2007, 30 pack year history  Lives in [place]    FH:  - DM, HTN in father, mother  - No cancer       Review of Systems  + Nausea, gets better with sitting up, tea. Eating     Objective     Vitals:    02/08/24 1010   BP: (!) 184/90   Pulse: 82   SpO2: 98%        Physical Exam  Constitutional:       General: She is not in acute distress.     Appearance: Normal appearance. She is normal weight.   HENT:      Head: Normocephalic and atraumatic.      Nose: Nose normal.   Eyes:      General: No scleral icterus.     Extraocular Movements: Extraocular movements intact.   Cardiovascular:      Rate and Rhythm: Normal rate and regular rhythm.      Heart sounds: Normal heart sounds. No murmur heard.     No friction rub. No gallop.   Pulmonary:      Effort: Pulmonary effort is normal. No respiratory distress.      Breath sounds: Normal breath sounds. No wheezing or rales.   Chest:      Chest wall: No tenderness.   Abdominal:      General: There is no distension.      Palpations: Abdomen is soft.      Tenderness: There is no abdominal tenderness.   Musculoskeletal:         General: Normal range of motion.      Cervical back: Normal range of motion and neck supple.      Right lower leg: No edema.      Left lower leg: No edema.   Skin:     General: Skin is warm and dry.   Neurological:       "Mental Status: She is alert and oriented to person, place, and time.   Psychiatric:         Mood and Affect: Mood normal.         Behavior: Behavior normal.           Current Outpatient Medications:     aspirin 81 mg EC tablet, Take 1 tablet (81 mg) by mouth once daily., Disp: , Rfl:     clopidogrel (Plavix) 75 mg tablet, TAKE 1 TABLET BY MOUTH ONCE DAILY, Disp: 90 tablet, Rfl: 0    glipiZIDE (Glucotrol) 10 mg tablet, TAKE 1 TABLET BY MOUTH TWICE DAILY, Disp: 60 tablet, Rfl: 0    Lantus Solostar U-100 Insulin 100 unit/mL (3 mL) pen, Inject 25 Units under the skin once daily at bedtime., Disp: 9 mL, Rfl: 0    amLODIPine (Norvasc) 10 mg tablet, Take 1 tablet (10 mg) by mouth once daily., Disp: 90 tablet, Rfl: 3    atorvastatin (Lipitor) 80 mg tablet, Take 1 tablet (80 mg) by mouth once daily., Disp: , Rfl:     chlorthalidone (Hygroton) 25 mg tablet, Take 0.5 tablets (12.5 mg) by mouth once daily., Disp: 45 tablet, Rfl: 0    lisinopril 40 mg tablet, Take 1 tablet (40 mg) by mouth once daily., Disp: 90 tablet, Rfl: 3    pen needle, diabetic (BD Ultra-Fine Micro Pen Needle) 32 gauge x 1/4\" needle, Inject 1 each under the skin 3 times a day., Disp: 100 each, Rfl: 11       Assessment/Plan   67 yo F  with PMH of HTN, poorly controlled T2DM on insulin, HLD, prior CVA in 2019 on DAPT, GERD.     #Blood Pressure, poorly controlled  - Increase Lisinopril to 40 mg today  - Stop hydral (patient only taking this one time a day)  - Can consider increasing Chlorthalidone if still poorly controlled on next visit    # GERD  # Nausea  - Symptoms improved with PPI in past per pt  - Trial PPI again    #T2DM  - [facility] pharmacy referral   - Would challenge with GLP-1 again if nausea symptoms are better controlled with PPI, try to downtitrate glipizide  - If A1c is still high, will start SGLT2i on next visit  - Diabetic eye exam needed    #Health Maintenance    Cancer Screening  - Mammography: due  - Colorectal Cancer Screening: Last cscope " 2018    Laboratory Screening  - Lipid Screen: due  - ASCVD Score: due  - A1C, glucose screen: due  - Hep C screen: due    Imaging Screening  - Osteoporosis/DEXA screening: due  - CT lung cancer screening due    Immunizations:   - Influenza last 2019  - COVID Pfizer x 2 in 2021  - Pneumovax 23 in 2018, unclear if got prevnar, would discuss this on next visit  - Shingrix due    Other Screening  - Depression screen: negative  - Home safety/partner violence screen: patient feels safe at home     Buster Alvarenga MD   Internal Medicine PGY-2

## 2024-02-09 ENCOUNTER — LAB (OUTPATIENT)
Dept: LAB | Facility: LAB | Age: 69
End: 2024-02-09
Payer: MEDICARE

## 2024-02-09 DIAGNOSIS — E11.9 DIABETES MELLITUS TYPE 2 WITHOUT RETINOPATHY (MULTI): ICD-10-CM

## 2024-02-09 LAB
ALBUMIN SERPL BCP-MCNC: 4 G/DL (ref 3.4–5)
ALP SERPL-CCNC: 90 U/L (ref 33–136)
ALT SERPL W P-5'-P-CCNC: 8 U/L (ref 7–45)
ANION GAP SERPL CALC-SCNC: 14 MMOL/L (ref 10–20)
AST SERPL W P-5'-P-CCNC: 11 U/L (ref 9–39)
BILIRUB SERPL-MCNC: 0.3 MG/DL (ref 0–1.2)
BUN SERPL-MCNC: 25 MG/DL (ref 6–23)
CALCIUM SERPL-MCNC: 9.4 MG/DL (ref 8.6–10.6)
CHLORIDE SERPL-SCNC: 106 MMOL/L (ref 98–107)
CHOLEST SERPL-MCNC: 222 MG/DL (ref 0–199)
CHOLESTEROL/HDL RATIO: 3.4
CO2 SERPL-SCNC: 28 MMOL/L (ref 21–32)
CREAT SERPL-MCNC: 1.27 MG/DL (ref 0.5–1.05)
EGFRCR SERPLBLD CKD-EPI 2021: 46 ML/MIN/1.73M*2
ERYTHROCYTE [DISTWIDTH] IN BLOOD BY AUTOMATED COUNT: 13.2 % (ref 11.5–14.5)
EST. AVERAGE GLUCOSE BLD GHB EST-MCNC: 212 MG/DL
GLUCOSE SERPL-MCNC: 174 MG/DL (ref 74–99)
HBA1C MFR BLD: 9 %
HCT VFR BLD AUTO: 37.3 % (ref 36–46)
HDLC SERPL-MCNC: 66 MG/DL
HGB BLD-MCNC: 12.1 G/DL (ref 12–16)
LDLC SERPL CALC-MCNC: 140 MG/DL
MCH RBC QN AUTO: 26.4 PG (ref 26–34)
MCHC RBC AUTO-ENTMCNC: 32.4 G/DL (ref 32–36)
MCV RBC AUTO: 81 FL (ref 80–100)
NON HDL CHOLESTEROL: 156 MG/DL (ref 0–149)
NRBC BLD-RTO: 0 /100 WBCS (ref 0–0)
PLATELET # BLD AUTO: 183 X10*3/UL (ref 150–450)
POTASSIUM SERPL-SCNC: 3.9 MMOL/L (ref 3.5–5.3)
PROT SERPL-MCNC: 6.6 G/DL (ref 6.4–8.2)
RBC # BLD AUTO: 4.59 X10*6/UL (ref 4–5.2)
SODIUM SERPL-SCNC: 144 MMOL/L (ref 136–145)
TRIGL SERPL-MCNC: 79 MG/DL (ref 0–149)
VLDL: 16 MG/DL (ref 0–40)
WBC # BLD AUTO: 8.7 X10*3/UL (ref 4.4–11.3)

## 2024-02-09 PROCEDURE — 85027 COMPLETE CBC AUTOMATED: CPT

## 2024-02-09 PROCEDURE — 80053 COMPREHEN METABOLIC PANEL: CPT

## 2024-02-09 PROCEDURE — 36415 COLL VENOUS BLD VENIPUNCTURE: CPT

## 2024-02-09 PROCEDURE — 83036 HEMOGLOBIN GLYCOSYLATED A1C: CPT

## 2024-02-09 PROCEDURE — 80061 LIPID PANEL: CPT

## 2024-02-22 ENCOUNTER — OFFICE VISIT (OUTPATIENT)
Dept: PRIMARY CARE | Facility: CLINIC | Age: 69
End: 2024-02-22
Payer: MEDICARE

## 2024-02-22 VITALS
WEIGHT: 184 LBS | OXYGEN SATURATION: 98 % | SYSTOLIC BLOOD PRESSURE: 162 MMHG | HEART RATE: 55 BPM | DIASTOLIC BLOOD PRESSURE: 88 MMHG | BODY MASS INDEX: 28.82 KG/M2

## 2024-02-22 DIAGNOSIS — N18.31 STAGE 3A CHRONIC KIDNEY DISEASE (MULTI): ICD-10-CM

## 2024-02-22 DIAGNOSIS — K21.9 GASTROESOPHAGEAL REFLUX DISEASE, UNSPECIFIED WHETHER ESOPHAGITIS PRESENT: Primary | ICD-10-CM

## 2024-02-22 DIAGNOSIS — E11.9 DIABETES MELLITUS TYPE 2 WITHOUT RETINOPATHY (MULTI): ICD-10-CM

## 2024-02-22 PROCEDURE — 4010F ACE/ARB THERAPY RXD/TAKEN: CPT | Performed by: STUDENT IN AN ORGANIZED HEALTH CARE EDUCATION/TRAINING PROGRAM

## 2024-02-22 PROCEDURE — 3050F LDL-C >= 130 MG/DL: CPT | Performed by: STUDENT IN AN ORGANIZED HEALTH CARE EDUCATION/TRAINING PROGRAM

## 2024-02-22 PROCEDURE — 99214 OFFICE O/P EST MOD 30 MIN: CPT | Performed by: STUDENT IN AN ORGANIZED HEALTH CARE EDUCATION/TRAINING PROGRAM

## 2024-02-22 PROCEDURE — 1126F AMNT PAIN NOTED NONE PRSNT: CPT | Performed by: STUDENT IN AN ORGANIZED HEALTH CARE EDUCATION/TRAINING PROGRAM

## 2024-02-22 PROCEDURE — 3052F HG A1C>EQUAL 8.0%<EQUAL 9.0%: CPT | Performed by: STUDENT IN AN ORGANIZED HEALTH CARE EDUCATION/TRAINING PROGRAM

## 2024-02-22 PROCEDURE — 3078F DIAST BP <80 MM HG: CPT | Performed by: STUDENT IN AN ORGANIZED HEALTH CARE EDUCATION/TRAINING PROGRAM

## 2024-02-22 PROCEDURE — 3077F SYST BP >= 140 MM HG: CPT | Performed by: STUDENT IN AN ORGANIZED HEALTH CARE EDUCATION/TRAINING PROGRAM

## 2024-02-22 PROCEDURE — 1036F TOBACCO NON-USER: CPT | Performed by: STUDENT IN AN ORGANIZED HEALTH CARE EDUCATION/TRAINING PROGRAM

## 2024-02-22 RX ORDER — OMEPRAZOLE 20 MG/1
20 CAPSULE, DELAYED RELEASE ORAL DAILY
Qty: 30 CAPSULE | Refills: 11 | Status: SHIPPED | OUTPATIENT
Start: 2024-02-22 | End: 2025-02-21

## 2024-02-22 RX ORDER — ATORVASTATIN CALCIUM 40 MG/1
40 TABLET, FILM COATED ORAL DAILY
Qty: 90 TABLET | Refills: 3 | Status: SHIPPED | OUTPATIENT
Start: 2024-02-22 | End: 2025-02-21

## 2024-02-22 RX ORDER — INSULIN GLARGINE 100 [IU]/ML
25 INJECTION, SOLUTION SUBCUTANEOUS NIGHTLY
Qty: 9 ML | Refills: 0 | Status: SHIPPED | OUTPATIENT
Start: 2024-02-22 | End: 2024-03-28 | Stop reason: SDUPTHER

## 2024-02-22 NOTE — PROGRESS NOTES
Subjective   Patient ID: Tammy Becerra is a 68 y.o. female with HTN, poorly controlled T2DM on insulin, HLD, prior CVA in 2019 on DAPT, GERD who presents for Follow-up       HPI  #Nausea  -Still having nausea in AM, gets better once she eats and takes BP meds in the morning  -Did not start PPI yet  -Not having low BG     #HTN  -Still uncontrolled  -Skips chlorthalidone often due to increased urination   -Taking lisinopril 40 and amlo 10    #HLD  -Not taking atorvastatin 80mg because she states she felt unwell when she increased from 40 to 80, so now she is not taking any    #DM II  -Still uncontrolled, a little better-- down to A1c of 9  -Taking insulin 25 at night, glipizide 10mg BID   -Did not take GLP1 or SGLT2i due to cost  -[facility] pharmacy appointment next week     #CVA hx  -Not taking statin  -Taking DAPT      Social History:  Retired  Lives with partner  Rarely exercises   Stopped smoking 2007, 30 pack year history  Lives in [place]    FH:  - DM, HTN in father, mother  - No cancer       Review of Systems  + Nausea, gets better with sitting up, tea. Eating     Objective     Vitals:    02/22/24 1137   BP: 180/78   Pulse: 55   SpO2: 98%        Physical Exam  Constitutional:       General: She is not in acute distress.     Appearance: Normal appearance. She is normal weight.   HENT:      Head: Normocephalic and atraumatic.      Nose: Nose normal.   Eyes:      General: No scleral icterus.     Extraocular Movements: Extraocular movements intact.   Cardiovascular:      Rate and Rhythm: Normal rate and regular rhythm.      Heart sounds: Normal heart sounds. No murmur heard.     No friction rub. No gallop.   Pulmonary:      Effort: Pulmonary effort is normal. No respiratory distress.      Breath sounds: Normal breath sounds. No wheezing or rales.   Chest:      Chest wall: No tenderness.   Abdominal:      General: There is no distension.      Palpations: Abdomen is soft.      Tenderness: There is no abdominal  tenderness.   Musculoskeletal:         General: Normal range of motion.      Cervical back: Normal range of motion and neck supple.      Right lower leg: No edema.      Left lower leg: No edema.   Skin:     General: Skin is warm and dry.   Neurological:      Mental Status: She is alert and oriented to person, place, and time.   Psychiatric:         Mood and Affect: Mood normal.         Behavior: Behavior normal.           Assessment/Plan   Tammy Becerra is a 68 y.o. female with HTN, poorly controlled T2DM on insulin, HLD, prior CVA in 2019 on DAPT, GERD who presents for Follow-up.        #Blood Pressure, poorly controlled  - Slightly better today 180s--> 160s systolic  -Encouraged taking her chlorthalidone everyday, continue lisinopril and amlo  -No other changes today since she has not been taking chlorthalidone consistently-- will try that first   -Recommended ambulatory monitoring     # GERD  # Nausea  -Sending script for omeprazole today    #HLD  -Will go back down to atorvastatin 40mg since she completely stopped taking a statin due to side effects with 80    #T2DM  - [facility] pharmacy referral   -Will try to start jardiance due to her CKD  -Taking insulin 25 at night, glipizide 10mg BID   - Would challenge with GLP-1 again if nausea symptoms are better controlled with PPI, try to downtitrate glipizide  - Diabetic eye exam needed    #Health Maintenance    Cancer Screening  - Mammography: due and ordered   - Colorectal Cancer Screening: Last cscope 2018      Imaging Screening  - Osteoporosis/DEXA screening: due and ordered   - CT lung cancer screening not indicated as she quit over 15 years ago    Immunizations:   - Influenza last 2022  - COVID Pfizer x 2 in 2021  - Pneumovax 23 in 2018, unclear if got prevnar, would discuss this on next visit  - Shingrix due    Follow up in 1 month for DM and HTN

## 2024-02-22 NOTE — PATIENT INSTRUCTIONS
Thank you for coming today Tammy!    Try to take blood pressure at home twice a week and write it down, so we can compare how it is going at home.    Please take atorvastatin 40mg everyday (half tablet). I will send 40mg tablets to the pharmacy.    Please start omeprazole daily to see if this helps with nausea.    Let's see if jardiance is covered. If it is, please start taking jardiance 10mg daily. Our goal is to do this and get off glipizide in the future.    I will see you in one month!

## 2024-02-29 ENCOUNTER — TELEMEDICINE (OUTPATIENT)
Dept: PHARMACY | Facility: HOSPITAL | Age: 69
End: 2024-02-29
Payer: MEDICARE

## 2024-02-29 DIAGNOSIS — E11.9 DIABETES MELLITUS TYPE 2 WITHOUT RETINOPATHY (MULTI): ICD-10-CM

## 2024-02-29 NOTE — PROGRESS NOTES
Subjective   Patient ID: Tammy Becerra is a 68 y.o. female who presents for Diabetes.    Referring Provider: Silvia Wallace MD MPH     Patient presents to Clinical Pharmacy team for follow up after last visit on 10/17/23. Patient saw PCP back in September and was restarted on her Lantus after not taking it for >2 months. Patient's A1c during office visit was 9.5%, which was decreased from last A1c back in December of last year at 10.6%.      Patient usually measures her blood sugars in the mornings between 9-10 AM and in the evening between 7-8 PM. Reports BG in the mornings being around the 200s; these are not fasting readings as patient usually has some crackers and tea (without sugar) in the morning due to her acid reflux. Patient described being unable to do anything in the mornings for ~3 hours due to her acid reflux, which started after her stroke in 2019.       BG in the evenings are usually lower per patient, as she does not each much during the day-reports around the mid 100s. Denies any recent readings <100 mg/dL, though notes that she has hypoglycemia symptoms (dizziness/headaches) when her BG drops below 200.      Of note, patient is overwhelmed with current number of medications and is non-compliant as a result. Believes that her acid reflux makes this worse as it is hard to keep pills down until it resolves. Is unwilling to add on more medications at this time.     HISTORICAL MEDICATIONS:  -Metformin (stopped d/t GI issues)  -Trulicity (stopped d/t GI issues)      Objective     There were no vitals taken for this visit.     Labs  Lab Results   Component Value Date    BILITOT 0.3 02/09/2024    CALCIUM 9.4 02/09/2024    CO2 28 02/09/2024     02/09/2024    CREATININE 1.27 (H) 02/09/2024    GLUCOSE 174 (H) 02/09/2024    ALKPHOS 90 02/09/2024    K 3.9 02/09/2024    PROT 6.6 02/09/2024     02/09/2024    AST 11 02/09/2024    ALT 8 02/09/2024    BUN 25 (H) 02/09/2024    ANIONGAP 14 02/09/2024  "   MG 1.90 04/26/2022    PHOS 3.6 12/11/2019     10/01/2018    ALBUMIN 4.0 02/09/2024    LIPASE 28 04/25/2022    GFRF 54 (A) 09/14/2023     Lab Results   Component Value Date    TRIG 79 02/09/2024    CHOL 222 (H) 02/09/2024    LDLCALC 140 (H) 02/09/2024    HDL 66.0 02/09/2024     Lab Results   Component Value Date    HGBA1C 9.0 (H) 02/09/2024       Current Outpatient Medications on File Prior to Visit   Medication Sig Dispense Refill    amLODIPine (Norvasc) 10 mg tablet Take 1 tablet (10 mg) by mouth once daily. 90 tablet 3    aspirin 81 mg EC tablet Take 1 tablet (81 mg) by mouth once daily.      atorvastatin (Lipitor) 40 mg tablet Take 1 tablet (40 mg) by mouth once daily. 90 tablet 3    chlorthalidone (Hygroton) 25 mg tablet Take 0.5 tablets (12.5 mg) by mouth once daily. 45 tablet 0    clopidogrel (Plavix) 75 mg tablet TAKE 1 TABLET BY MOUTH ONCE DAILY 90 tablet 0    empagliflozin (Jardiance) 10 mg Take 1 tablet (10 mg) by mouth once daily. 30 tablet 11    glipiZIDE (Glucotrol) 10 mg tablet TAKE 1 TABLET BY MOUTH TWICE DAILY 60 tablet 0    insulin glargine (Lantus Solostar U-100 Insulin) 100 unit/mL (3 mL) pen Inject 25 Units under the skin once daily at bedtime. 9 mL 0    lisinopril 40 mg tablet Take 1 tablet (40 mg) by mouth once daily. 90 tablet 3    omeprazole (PriLOSEC) 20 mg DR capsule Take 1 capsule (20 mg) by mouth once daily. Do not crush or chew. 30 capsule 11    pen needle, diabetic (BD Ultra-Fine Micro Pen Needle) 32 gauge x 1/4\" needle Inject 1 each under the skin 3 times a day. 100 each 11     No current facility-administered medications on file prior to visit.        Assessment/Plan   Problem List Items Addressed This Visit             ICD-10-CM    Diabetes mellitus type 2 without retinopathy (CMS/[facility]) E11.9   Ashley Ko PharmD  Clinical Ambulatory Care Pharmacist  Ph: [phone]    Continue all meds under the continuation of care with the referring provider and clinical pharmacy " team.    Clinical Pharmacist follow up: 11/21/23 or sooner as needed based on clinical intervention  Type of Encounter:  Telephone    Verbal consent to manage patient's drug therapy was obtained from the patient. They were informed they may decline to participate or withdraw from participation in pharmacy services at any time.

## 2024-02-29 NOTE — PROGRESS NOTES
Subjective     Patient ID: Tammy Becerra is a 68 y.o. female who presents for Diabetes.    Referring Provider: Silvia Wallace MD M*     Diabetes  She presents for her follow-up diabetic visit. She has type 2 diabetes mellitus.     Patient usually measures her blood sugars in the mornings between 9-10 AM and in the evening between 7-8 PM. Reports BG in the mornings being around 170-200s; these are not fasting readings as patient usually has some crackers and tea (without sugar) in the morning due to her acid reflux.      BG in the evenings are usually lower per patient, as she does not each much during the day-reports around the 130-270. Denies any recent readings <100 mg/dL, though notes that she has hypoglycemia symptoms (dizziness/headaches) when her BG drops below 200.         HISTORICAL MEDICATIONS:  -Metformin (stopped d/t GI issues)  -Trulicity (stopped d/t GI issues)    Diet: not assessed today- significant issues with acid reflux, reminded patient that Omeprazole was sent to her pharmacy, advised to take 30 minutes prior to eating for best efficacy     Exercise: Not assessed today     No Known Allergies    Objective     Current DM Pharmacotherapy:   Lantus Solostar 100 units/mL - 25 units nightly   Glipizide 10 mg - 1 tablet by mouth twice daily     SECONDARY PREVENTION  - Statin? Yes  - ACE-I/ARB? Yes  - Aspirin? Yes    Current monitoring regimen:   Patient is using: glucometer    Testing frequency: 2-3 times daily     SMBG Readings: Generally in 200 mg/dL range - states she feels low if BG <200 mg/dL     Any episodes of hypoglycemia? No  Hypoglycemia awareness? Yes      Pertinent PMH Review:  - PMH of Pancreatitis: No  - PMH/FH of Medullary Thyroid Cancer: No  - PMH/FH of Multiple Endocrine Neoplasia (MEN) Type II: No  - PMH of Retinopathy: No  - PMH of Urinary Tract Infections: No    Lab Review  Lab Results   Component Value Date    BILITOT 0.3 02/09/2024    CALCIUM 9.4 02/09/2024    CO2 28  02/09/2024     02/09/2024    CREATININE 1.27 (H) 02/09/2024    GLUCOSE 174 (H) 02/09/2024    ALKPHOS 90 02/09/2024    K 3.9 02/09/2024    PROT 6.6 02/09/2024     02/09/2024    AST 11 02/09/2024    ALT 8 02/09/2024    BUN 25 (H) 02/09/2024    ANIONGAP 14 02/09/2024    MG 1.90 04/26/2022    PHOS 3.6 12/11/2019     10/01/2018    ALBUMIN 4.0 02/09/2024    LIPASE 28 04/25/2022    GFRF 54 (A) 09/14/2023     Lab Results   Component Value Date    TRIG 79 02/09/2024    CHOL 222 (H) 02/09/2024    LDLCALC 140 (H) 02/09/2024    HDL 66.0 02/09/2024     Lab Results   Component Value Date    HGBA1C 9.0 (H) 02/09/2024     The 10-year ASCVD risk score (Yamilet CASPER, et al., 2019) is: 37.8%    Values used to calculate the score:      Age: 68 years      Sex: Female      Is Non- : Yes      Diabetic: Yes      Tobacco smoker: No      Systolic Blood Pressure: 162 mmHg      Is BP treated: Yes      HDL Cholesterol: 66 mg/dL      Total Cholesterol: 222 mg/dL      Assessment/Plan     Problem List Items Addressed This Visit       Diabetes mellitus type 2 without retinopathy (CMS/[facility])     Patient is not comfortable with any increase in insulin dose at this time. Patient is open to addition of SGLT-2 if cost is no longer a concern. Discussed lifestyle changes at this time.     Patient Assistance Screening (VAF)    Patient verbally reports monthly or yearly income which is less than 400% federal poverty level    Application for program will be submitted for the following medications: SGLT-2 inhibitor    Patient aware that they will need to provide appropriate proof of income documents: Statement of non-filing and SSB/Pension statement     Patient aware this process may take up to 2-4 weeks from time of submission and application will be submitted upon receipt of income documents     If approved, medication must be filled through [facility] pharmacy and may be picked up or mailed to patient.        Discussed why she is having hypoglycemia symptoms even when she is not truly low. Encouraged patient to try and push through those symptoms and all her body to re-acclimate to normal blood sugars.       Interested in Dexcom G7 - will determine coverage through insurance     Type 2 diabetes mellitus, is not at goal. Goal A1C: <7%    Follow up: I recommend diabetes care be 3 weeks.    Ashley McculloughD [facility]  Clinical Pharmacy Specialist, Primary Care     Continue all meds under the continuation of care with the referring provider and clinical pharmacy team

## 2024-03-03 DIAGNOSIS — E11.9 TYPE 2 DIABETES MELLITUS WITHOUT COMPLICATIONS (MULTI): ICD-10-CM

## 2024-03-05 RX ORDER — GLIPIZIDE 10 MG/1
TABLET ORAL
Qty: 60 TABLET | Refills: 0 | Status: SHIPPED | OUTPATIENT
Start: 2024-03-05 | End: 2024-03-11 | Stop reason: SDUPTHER

## 2024-03-11 DIAGNOSIS — E11.9 TYPE 2 DIABETES MELLITUS WITHOUT COMPLICATIONS (MULTI): ICD-10-CM

## 2024-03-11 RX ORDER — GLIPIZIDE 10 MG/1
10 TABLET ORAL 2 TIMES DAILY
Qty: 60 TABLET | Refills: 0 | Status: SHIPPED | OUTPATIENT
Start: 2024-03-11 | End: 2024-03-28 | Stop reason: SDUPTHER

## 2024-03-27 ENCOUNTER — OFFICE VISIT (OUTPATIENT)
Dept: VASCULAR SURGERY | Facility: CLINIC | Age: 69
End: 2024-03-27
Payer: MEDICARE

## 2024-03-27 VITALS
SYSTOLIC BLOOD PRESSURE: 174 MMHG | DIASTOLIC BLOOD PRESSURE: 88 MMHG | HEART RATE: 74 BPM | HEIGHT: 67 IN | WEIGHT: 181 LBS | BODY MASS INDEX: 28.41 KG/M2

## 2024-03-27 DIAGNOSIS — E11.51 DIABETES MELLITUS WITH PERIPHERAL VASCULAR DISEASE (MULTI): ICD-10-CM

## 2024-03-27 DIAGNOSIS — I73.9 PAD (PERIPHERAL ARTERY DISEASE) (CMS-HCC): Primary | ICD-10-CM

## 2024-03-27 PROCEDURE — 3050F LDL-C >= 130 MG/DL: CPT | Performed by: SURGERY

## 2024-03-27 PROCEDURE — 1159F MED LIST DOCD IN RCRD: CPT | Performed by: SURGERY

## 2024-03-27 PROCEDURE — 99214 OFFICE O/P EST MOD 30 MIN: CPT | Performed by: SURGERY

## 2024-03-27 PROCEDURE — 3077F SYST BP >= 140 MM HG: CPT | Performed by: SURGERY

## 2024-03-27 PROCEDURE — 3079F DIAST BP 80-89 MM HG: CPT | Performed by: SURGERY

## 2024-03-27 PROCEDURE — 3052F HG A1C>EQUAL 8.0%<EQUAL 9.0%: CPT | Performed by: SURGERY

## 2024-03-27 PROCEDURE — 4010F ACE/ARB THERAPY RXD/TAKEN: CPT | Performed by: SURGERY

## 2024-03-27 ASSESSMENT — COLUMBIA-SUICIDE SEVERITY RATING SCALE - C-SSRS
6. HAVE YOU EVER DONE ANYTHING, STARTED TO DO ANYTHING, OR PREPARED TO DO ANYTHING TO END YOUR LIFE?: NO
2. HAVE YOU ACTUALLY HAD ANY THOUGHTS OF KILLING YOURSELF?: NO
1. IN THE PAST MONTH, HAVE YOU WISHED YOU WERE DEAD OR WISHED YOU COULD GO TO SLEEP AND NOT WAKE UP?: NO

## 2024-03-27 ASSESSMENT — PATIENT HEALTH QUESTIONNAIRE - PHQ9
SUM OF ALL RESPONSES TO PHQ9 QUESTIONS 1 AND 2: 0
2. FEELING DOWN, DEPRESSED OR HOPELESS: NOT AT ALL
1. LITTLE INTEREST OR PLEASURE IN DOING THINGS: NOT AT ALL

## 2024-03-27 NOTE — PROGRESS NOTES
"F/U REASON: PAD surveillance    CURRENT ENCOUNTER:  Tammy Becerra is 68 y.o. female here for follow up of PAD surveillance.    Walking is ok - no complaints  Right more than left foot gets \"stiff\" every now and then with walking  No claudication in the calf  No wounds or open sores  No smoking  KELLY studies reviewed and overall mild decrease/stable    Meds:   Current Outpatient Medications:     amLODIPine (Norvasc) 10 mg tablet, Take 1 tablet (10 mg) by mouth once daily., Disp: 90 tablet, Rfl: 3    aspirin 81 mg EC tablet, Take 1 tablet (81 mg) by mouth once daily., Disp: , Rfl:     atorvastatin (Lipitor) 40 mg tablet, Take 1 tablet (40 mg) by mouth once daily., Disp: 90 tablet, Rfl: 3    chlorthalidone (Hygroton) 25 mg tablet, Take 0.5 tablets (12.5 mg) by mouth once daily., Disp: 45 tablet, Rfl: 0    clopidogrel (Plavix) 75 mg tablet, TAKE 1 TABLET BY MOUTH ONCE DAILY, Disp: 90 tablet, Rfl: 0    empagliflozin (Jardiance) 10 mg, Take 1 tablet (10 mg) by mouth once daily., Disp: 30 tablet, Rfl: 11    glipiZIDE (Glucotrol) 10 mg tablet, Take 1 tablet (10 mg) by mouth 2 times a day., Disp: 60 tablet, Rfl: 0    insulin glargine (Lantus Solostar U-100 Insulin) 100 unit/mL (3 mL) pen, Inject 25 Units under the skin once daily at bedtime., Disp: 9 mL, Rfl: 0    lisinopril 40 mg tablet, Take 1 tablet (40 mg) by mouth once daily., Disp: 90 tablet, Rfl: 3    omeprazole (PriLOSEC) 20 mg DR capsule, Take 1 capsule (20 mg) by mouth once daily. Do not crush or chew., Disp: 30 capsule, Rfl: 11    pen needle, diabetic (BD Ultra-Fine Micro Pen Needle) 32 gauge x 1/4\" needle, Inject 1 each under the skin 3 times a day., Disp: 100 each, Rfl: 11    Allergies:   No Known Allergies    ROS:  Review of Systems    otherwise unremarkable    Objective:  Vitals:  Vitals:    03/27/24 1206   BP: 174/88   Pulse: 74        Exam:    No distress  Breathing comfortably   Not tachycardic  Abd soft, nt, nd  B/l legs/feet well perfused feet  B/l at/pt " signals  No leg edema      Labs:  Lab Results   Component Value Date    WBC 8.7 02/09/2024    WBC 6.0 04/27/2022    WBC 10.0 04/25/2022    HGB 12.1 02/09/2024    HGB 12.3 04/27/2022    HGB 15.7 04/25/2022    HCT 37.3 02/09/2024    HCT 36.5 04/27/2022    HCT 48.4 (H) 04/25/2022    MCV 81 02/09/2024    MCV 80 04/27/2022    MCV 84 04/25/2022     02/09/2024     Lab Results   Component Value Date    CREATININE 1.27 (H) 02/09/2024    CREATININE 1.12 (H) 09/14/2023    CREATININE 1.17 (H) 12/22/2022    BUN 25 (H) 02/09/2024    BUN 31 (H) 09/14/2023    BUN 19 12/22/2022     02/09/2024     09/14/2023     12/22/2022    K 3.9 02/09/2024    K 3.9 09/14/2023    K 3.6 12/22/2022     02/09/2024     09/14/2023     12/22/2022    CO2 28 02/09/2024    CO2 29 09/14/2023    CO2 30 12/22/2022         Imaging:  Vira reviewed    Assessment & Plan:  Tammy Becerra is 68 y.o. female with history DM, cerebellar TIA on aspirin and plavix with right foot rest pain/CLTI     S/P 10/5/2020;  Post-Procedure Diagnosis: Superficial femoral artery and tibioperoneal trunk stenosis  Procedure: 1. Ultrasound guided access of the left common femoral artery  2. Aortogram  3. Selective right lower extremity angiogram  4. PTA and DCB angioplasty of the right superficial femoral artery and  tibioperoneal trunk     s/p 12-  POSTOPERATIVE DIAGNOSIS:  Critical limb threatening ischemia with rest pain, right lower extremity.  OPERATION/PROCEDURE:  1. Ultrasound-guided access to the left common femoral artery.  2. Aortography with right lower extremity angiography with final catheter placement in the right peroneal artery.  3. Recanalization of occluded right superficial femoral artery.  4. Balloon angioplasty of the right SFA, popliteal artery to 4 mm balloon angioplasty.  5. Drug-coated balloon angioplasty to the right superficial femoral artery with a 5 x 120 mm balloon.  6. Right peroneal and tibioperoneal trunk  balloon angioplasty with a 2.5 mm balloon.  7. ProGlide closure left groin with completion angiogram.    Overall has mild PAD currently asx  Has been stable on best medical therapy  Can continue BMT and see me is symptoms worsen or develops rest pain/wounds.         Carlos Alberto Henderson MD, MHS, RPVI  , [facility] [employer]  Director, [facility], [facility] [place]  Co-Director, Vascular Laboratories, [facility] [place]  Division of Vascular Surgery and Endovascular Therapy  [facility]

## 2024-03-27 NOTE — PATIENT INSTRUCTIONS
It was a pleasure taking care of you today and appreciate your seeing us at our [facility].     Today's plan is as follows:  1) please continue with your medications as you are doing  2) see me as needed or if any symptoms were to worse  3)      Please call the office with any questions at [phone].   You can speak to our secretaries or our clinical nurses for specific questions.   For [facility] specific questions, you can also call [phone] or email at [email or web address]  If you need coordinating your appointments and testing you can do these at the  or by calling my office shortly after your visit.

## 2024-03-28 ENCOUNTER — OFFICE VISIT (OUTPATIENT)
Dept: PRIMARY CARE | Facility: CLINIC | Age: 69
End: 2024-03-28
Payer: MEDICARE

## 2024-03-28 VITALS
WEIGHT: 181 LBS | HEART RATE: 76 BPM | DIASTOLIC BLOOD PRESSURE: 80 MMHG | SYSTOLIC BLOOD PRESSURE: 142 MMHG | OXYGEN SATURATION: 98 % | BODY MASS INDEX: 28.35 KG/M2

## 2024-03-28 DIAGNOSIS — E11.9 TYPE 2 DIABETES MELLITUS WITHOUT COMPLICATIONS (MULTI): ICD-10-CM

## 2024-03-28 DIAGNOSIS — R22.2 MASS OF SUBCUTANEOUS TISSUE OF BACK: Primary | ICD-10-CM

## 2024-03-28 DIAGNOSIS — I10 HYPERTENSION, UNSPECIFIED TYPE: ICD-10-CM

## 2024-03-28 DIAGNOSIS — E55.9 VITAMIN D DEFICIENCY: ICD-10-CM

## 2024-03-28 DIAGNOSIS — E11.9 DIABETES MELLITUS TYPE 2 WITHOUT RETINOPATHY (MULTI): ICD-10-CM

## 2024-03-28 DIAGNOSIS — Z78.0 ASYMPTOMATIC MENOPAUSAL STATE: ICD-10-CM

## 2024-03-28 DIAGNOSIS — E78.5 HYPERLIPIDEMIA, UNSPECIFIED HYPERLIPIDEMIA TYPE: ICD-10-CM

## 2024-03-28 PROCEDURE — 3052F HG A1C>EQUAL 8.0%<EQUAL 9.0%: CPT | Performed by: STUDENT IN AN ORGANIZED HEALTH CARE EDUCATION/TRAINING PROGRAM

## 2024-03-28 PROCEDURE — 3079F DIAST BP 80-89 MM HG: CPT | Performed by: STUDENT IN AN ORGANIZED HEALTH CARE EDUCATION/TRAINING PROGRAM

## 2024-03-28 PROCEDURE — 99213 OFFICE O/P EST LOW 20 MIN: CPT | Performed by: STUDENT IN AN ORGANIZED HEALTH CARE EDUCATION/TRAINING PROGRAM

## 2024-03-28 PROCEDURE — 3050F LDL-C >= 130 MG/DL: CPT | Performed by: STUDENT IN AN ORGANIZED HEALTH CARE EDUCATION/TRAINING PROGRAM

## 2024-03-28 PROCEDURE — 4010F ACE/ARB THERAPY RXD/TAKEN: CPT | Performed by: STUDENT IN AN ORGANIZED HEALTH CARE EDUCATION/TRAINING PROGRAM

## 2024-03-28 PROCEDURE — 3077F SYST BP >= 140 MM HG: CPT | Performed by: STUDENT IN AN ORGANIZED HEALTH CARE EDUCATION/TRAINING PROGRAM

## 2024-03-28 PROCEDURE — 1159F MED LIST DOCD IN RCRD: CPT | Performed by: STUDENT IN AN ORGANIZED HEALTH CARE EDUCATION/TRAINING PROGRAM

## 2024-03-28 RX ORDER — CHLORTHALIDONE 25 MG/1
12.5 TABLET ORAL DAILY
Qty: 45 TABLET | Refills: 3 | Status: SHIPPED | OUTPATIENT
Start: 2024-03-28

## 2024-03-28 RX ORDER — INSULIN GLARGINE 100 [IU]/ML
25 INJECTION, SOLUTION SUBCUTANEOUS NIGHTLY
Qty: 9 ML | Refills: 3 | Status: SHIPPED | OUTPATIENT
Start: 2024-03-28

## 2024-03-28 RX ORDER — GLIPIZIDE 10 MG/1
10 TABLET ORAL 2 TIMES DAILY
Qty: 60 TABLET | Refills: 3 | Status: SHIPPED | OUTPATIENT
Start: 2024-03-28

## 2024-03-28 NOTE — PATIENT INSTRUCTIONS
Thank you for coming today Tammy!    Please schedule your ultrasound of your back. You can get this done on the lower level in [address]. The phone number is [phone].     I will see you in 3 months!

## 2024-03-28 NOTE — PROGRESS NOTES
Subjective   Patient ID: Tammy Becerra is a 68 y.o. female with HTN, poorly controlled T2DM on insulin, HLD, prior CVA in 2019 on DAPT, GERD who presents for Follow-up       HPI  Concerns today:  #Mass on back  -Patient states that she has had a mass on her back for a while  -Just noticed that it seems bigger in the last couple weeks  -Hard to reach for her, so she is not sure  -Nontender    Chronic issues:  #Nausea  -Improving with PPI   -Not having symptoms as bad in the morning    #HTN  -A little better today -- 142/80  -Skips chlorthalidone often due to increased urination   -Taking lisinopril 40 and amlo 10    #HLD  -Not taking atorvastatin 80mg because she states she felt unwell when she increased from 40 to 80, so now she is not taking any    #DM II  -Still uncontrolled, a little better-- down to A1c of 9  -Taking insulin 20 to 25 at night, glipizide 10mg BID   -Did not take GLP1 or SGLT2i due to cost  -[facility] following, appreciate recs    #CVA hx  -Taking DAPT    Social History:  Retired  Lives with partner  Rarely exercises   Stopped smoking 2007, 30 pack year history  Lives in [place]    FH:  - DM, HTN in father, mother  - No cancer       Objective     Vitals:    03/28/24 1420   BP: 142/80   Pulse: 76   SpO2: 98%      Exam:   General: Well appearing, conversational, in no acute distress  HEENT: EOMI, PERRL, nares patent without congestion, MMM  CV: RRR, no murmurs  Resp: Lungs CTAB, normal work of breathing  GI: Soft, nondistended, nontender, BS+   Skin: L thoracic area with 6x4cm subcutaneous soft mass, nontender   Neuro: Awake, alert, oriented x3, moving all 4 extremities, nonfocal, normal gait, ambulates without assistance  Psych: Appropriate mood and affect      Assessment/Plan   Tammy Becerra is a 68 y.o. female with HTN, poorly controlled T2DM on insulin, HLD, prior CVA in 2019 on DAPT, GERD who presents for Follow-up. She has a mass on her left back consistent with lipoma. She states it  has grown in size, so will get an ultrasound since she is not sure how fast this happened. Her nausea in the morning is improving with PPI.      -Ultrasound ordered  -Will follow up for annual exam in 3 months   -Blood work ordered

## 2024-04-09 ENCOUNTER — TELEMEDICINE (OUTPATIENT)
Dept: PHARMACY | Facility: HOSPITAL | Age: 69
End: 2024-04-09
Payer: MEDICARE

## 2024-04-09 DIAGNOSIS — E11.9 DIABETES MELLITUS TYPE 2 WITHOUT RETINOPATHY (MULTI): ICD-10-CM

## 2024-04-09 NOTE — ASSESSMENT & PLAN NOTE
CONTINUE  Lantus Solostar 100 units/mL - 25 units nightly   Glipizide 10 mg - 1 tablet by mouth twice daily

## 2024-04-09 NOTE — PROGRESS NOTES
Subjective     Patient ID: Tammy Becerra is a 68 y.o. female who presents for Diabetes.    Referring Provider: Silvia Wallace MD M*     Diabetes  She presents for her follow-up diabetic visit. She has type 2 diabetes mellitus.        HISTORICAL MEDICATIONS:  -Metformin (stopped d/t GI issues)  -Trulicity (stopped d/t GI issues)    Diet: states acid reflux has been better since starting Omeprazole has not had any symptoms in a few days     Exercise: Not assessed today     No Known Allergies    Objective     Current DM Pharmacotherapy:   Lantus Solostar 100 units/mL - 25 units nightly   Glipizide 10 mg - 1 tablet by mouth twice daily     SECONDARY PREVENTION  - Statin? Yes  - ACE-I/ARB? Yes  - Aspirin? Yes    Current monitoring regimen:   Patient is using: glucometer    Testing frequency: 2-3 times daily     SMBG Readings: Generally 160-190 mg/dL - highest reading has been 220 mg/dL - no longer feeling low when she is in the 100s     Any episodes of hypoglycemia? No  Hypoglycemia awareness? Yes      Pertinent PMH Review:  - PMH of Pancreatitis: No  - PMH/FH of Medullary Thyroid Cancer: No  - PMH/FH of Multiple Endocrine Neoplasia (MEN) Type II: No  - PMH of Retinopathy: No  - PMH of Urinary Tract Infections: No    Lab Review  Lab Results   Component Value Date    BILITOT 0.3 02/09/2024    CALCIUM 9.4 02/09/2024    CO2 28 02/09/2024     02/09/2024    CREATININE 1.27 (H) 02/09/2024    GLUCOSE 174 (H) 02/09/2024    ALKPHOS 90 02/09/2024    K 3.9 02/09/2024    PROT 6.6 02/09/2024     02/09/2024    AST 11 02/09/2024    ALT 8 02/09/2024    BUN 25 (H) 02/09/2024    ANIONGAP 14 02/09/2024    MG 1.90 04/26/2022    PHOS 3.6 12/11/2019     10/01/2018    ALBUMIN 4.0 02/09/2024    LIPASE 28 04/25/2022    GFRF 54 (A) 09/14/2023     Lab Results   Component Value Date    TRIG 79 02/09/2024    CHOL 222 (H) 02/09/2024    LDLCALC 140 (H) 02/09/2024    HDL 66.0 02/09/2024     Lab Results   Component Value Date     HGBA1C 9.0 (H) 02/09/2024     The 10-year ASCVD risk score (Yamilet CASPER, et al., 2019) is: 29.7%    Values used to calculate the score:      Age: 68 years      Sex: Female      Is Non- : Yes      Diabetic: Yes      Tobacco smoker: No      Systolic Blood Pressure: 142 mmHg      Is BP treated: Yes      HDL Cholesterol: 66 mg/dL      Total Cholesterol: 222 mg/dL      Assessment/Plan     Problem List Items Addressed This Visit       Diabetes mellitus type 2 without retinopathy (CMS/[facility])     CONTINUE  Lantus Solostar 100 units/mL - 25 units nightly   Glipizide 10 mg - 1 tablet by mouth twice daily           Patient is not comfortable with any increase in insulin dose at this time. Patient is open to addition of SGLT-2 if cost is no longer a concern. Discussed lifestyle changes at this time.     Patient Assistance Screening (VAF)    Patient verbally reports monthly or yearly income which is less than 400% federal poverty level    Application for program will be submitted for the following medications: SGLT-2 inhibitor    Patient aware that they will need to provide appropriate proof of income documents: Statement of non-filing and SSB/Pension statement     Patient aware this process may take up to 2-4 weeks from time of submission and application will be submitted upon receipt of income documents     If approved, medication must be filled through [facility] pharmacy and may be picked up or mailed to patient.       Interested in Dexcom G7 - sent through [employer] [facility]- they are in contact with the patient     Type 2 diabetes mellitus, is not at goal. Goal A1C: <7%    Follow up: I recommend diabetes care be 2 weeks.    Ashley Ko PharmD [facility]  Clinical Pharmacy Specialist, Primary Care     Continue all meds under the continuation of care with the referring provider and clinical pharmacy team

## 2024-04-23 ENCOUNTER — TELEMEDICINE (OUTPATIENT)
Dept: PHARMACY | Facility: HOSPITAL | Age: 69
End: 2024-04-23

## 2024-04-23 DIAGNOSIS — E11.9 DIABETES MELLITUS TYPE 2 WITHOUT RETINOPATHY (MULTI): ICD-10-CM

## 2024-04-25 ENCOUNTER — APPOINTMENT (OUTPATIENT)
Dept: RADIOLOGY | Facility: CLINIC | Age: 69
End: 2024-04-25
Payer: MEDICARE

## 2024-04-25 NOTE — PROGRESS NOTES
Subjective     Patient ID: Tammy Becerra is a 68 y.o. female who presents for Diabetes.    Referring Provider: Silvia Wallace MD M*     Diabetes  She presents for her follow-up diabetic visit. She has type 2 diabetes mellitus.        HISTORICAL MEDICATIONS:  -Metformin (stopped d/t GI issues)  -Trulicity (stopped d/t GI issues)    Diet: Soup - raisin bread- crackers- chicken- green beans - sweets - oranges - apples - banana - just water to drink - lemonade (avoiding right now)     Exercise: Walking     No Known Allergies    Objective     Current DM Pharmacotherapy:   Lantus Solostar 100 units/mL - 25 units nightly   Glipizide 10 mg - 1 tablet by mouth twice daily     SECONDARY PREVENTION  - Statin? Yes  - ACE-I/ARB? Yes  - Aspirin? Yes    Current monitoring regimen:   Patient is using: glucometer    Testing frequency: 2-3 times daily     SMBG Readings: Generally 160-190 mg/dL - highest reading has been 220 mg/dL - no longer feeling low when she is in the 100s     Any episodes of hypoglycemia? No  Hypoglycemia awareness? Yes      Pertinent PMH Review:  - PMH of Pancreatitis: No  - PMH/FH of Medullary Thyroid Cancer: No  - PMH/FH of Multiple Endocrine Neoplasia (MEN) Type II: No  - PMH of Retinopathy: No  - PMH of Urinary Tract Infections: No    Lab Review  Lab Results   Component Value Date    BILITOT 0.3 02/09/2024    CALCIUM 9.4 02/09/2024    CO2 28 02/09/2024     02/09/2024    CREATININE 1.27 (H) 02/09/2024    GLUCOSE 174 (H) 02/09/2024    ALKPHOS 90 02/09/2024    K 3.9 02/09/2024    PROT 6.6 02/09/2024     02/09/2024    AST 11 02/09/2024    ALT 8 02/09/2024    BUN 25 (H) 02/09/2024    ANIONGAP 14 02/09/2024    MG 1.90 04/26/2022    PHOS 3.6 12/11/2019     10/01/2018    ALBUMIN 4.0 02/09/2024    LIPASE 28 04/25/2022    GFRF 54 (A) 09/14/2023     Lab Results   Component Value Date    TRIG 79 02/09/2024    CHOL 222 (H) 02/09/2024    LDLCALC 140 (H) 02/09/2024    HDL 66.0 02/09/2024     Lab  Results   Component Value Date    HGBA1C 9.0 (H) 02/09/2024     The 10-year ASCVD risk score (Yamilet CASPER, et al., 2019) is: 29.7%    Values used to calculate the score:      Age: 68 years      Sex: Female      Is Non- : Yes      Diabetic: Yes      Tobacco smoker: No      Systolic Blood Pressure: 142 mmHg      Is BP treated: Yes      HDL Cholesterol: 66 mg/dL      Total Cholesterol: 222 mg/dL      Assessment/Plan     Problem List Items Addressed This Visit       Diabetes mellitus type 2 without retinopathy (Multi)     CONTINUE  Lantus Solostar 100 units/mL - 25 units nightly   Glipizide 10 mg - 1 tablet by mouth twice daily           Patient is not comfortable with any increase in insulin dose at this time. Patient is open to addition of SGLT-2 if cost is no longer a concern. Discussed lifestyle changes at this time.     Patient Assistance Screening (VAF)    Patient verbally reports monthly or yearly income which is less than 400% federal poverty level    Application for program will be submitted for the following medications: SGLT-2 inhibitor    Patient aware that they will need to provide appropriate proof of income documents: Statement of non-filing and SSB/Pension statement - email sent to patient again     Patient aware this process may take up to 2-4 weeks from time of submission and application will be submitted upon receipt of income documents     If approved, medication must be filled through [facility] pharmacy and may be picked up or mailed to patient.       Interested in Dexcom G7 - sent through [employer]- they are in contact with the patient     Type 2 diabetes mellitus, is not at goal. Goal A1C: <7%    Follow up: I recommend diabetes care be 3 weeks.    Ashley Ko PharmD [facility]  Clinical Pharmacy Specialist, Primary Care     Continue all meds under the continuation of care with the referring provider and clinical pharmacy team

## 2024-05-07 ENCOUNTER — TELEMEDICINE (OUTPATIENT)
Dept: PHARMACY | Facility: HOSPITAL | Age: 69
End: 2024-05-07
Payer: MEDICARE

## 2024-05-07 DIAGNOSIS — E11.9 DIABETES MELLITUS TYPE 2 WITHOUT RETINOPATHY (MULTI): ICD-10-CM

## 2024-05-09 NOTE — ASSESSMENT & PLAN NOTE
CONTINUE  Lantus Solostar 100 units/mL - 25 units nightly   Glipizide 10 mg - 1 tablet by mouth twice daily     Patient remains open to starting SGLT-2 inhibitor pending VAF approval

## 2024-05-09 NOTE — PROGRESS NOTES
"Subjective     Patient ID: Tammy Becerra is a 68 y.o. female who presents for Diabetes.    Referring Provider: Silvia Wallace MD M*     Diabetes  She presents for her follow-up diabetic visit. She has type 2 diabetes mellitus.      Acid reflux \"bout\" this past weekend - tried tums thinks it made it worse -     HISTORICAL MEDICATIONS:  -Metformin (stopped d/t GI issues)  -Trulicity (stopped d/t GI issues)    Diet: Soup - raisin bread- crackers- chicken- green beans - sweets - oranges - apples - banana - just water to drink - lemonade (avoiding right now)     Exercise: Walking     No Known Allergies    Objective     Current DM Pharmacotherapy:   Lantus Solostar 100 units/mL - 25 units nightly   Glipizide 10 mg - 1 tablet by mouth twice daily     SECONDARY PREVENTION  - Statin? Yes  - ACE-I/ARB? Yes  - Aspirin? Yes    Current monitoring regimen:   Patient is using: glucometer    Testing frequency: 2-3 times daily     SMBG Readings: 106 mg/dL today - 116 mg.dL yesterday - ~180 on average - highs of 200-210 mg/dL     Any episodes of hypoglycemia? No  Hypoglycemia awareness? Yes      Pertinent PMH Review:  - PMH of Pancreatitis: No  - PMH/FH of Medullary Thyroid Cancer: No  - PMH/FH of Multiple Endocrine Neoplasia (MEN) Type II: No  - PMH of Retinopathy: No  - PMH of Urinary Tract Infections: No    Lab Review  Lab Results   Component Value Date    BILITOT 0.3 02/09/2024    CALCIUM 9.4 02/09/2024    CO2 28 02/09/2024     02/09/2024    CREATININE 1.27 (H) 02/09/2024    GLUCOSE 174 (H) 02/09/2024    ALKPHOS 90 02/09/2024    K 3.9 02/09/2024    PROT 6.6 02/09/2024     02/09/2024    AST 11 02/09/2024    ALT 8 02/09/2024    BUN 25 (H) 02/09/2024    ANIONGAP 14 02/09/2024    MG 1.90 04/26/2022    PHOS 3.6 12/11/2019     10/01/2018    ALBUMIN 4.0 02/09/2024    LIPASE 28 04/25/2022    GFRF 54 (A) 09/14/2023     Lab Results   Component Value Date    TRIG 79 02/09/2024    CHOL 222 (H) 02/09/2024    LDLCALC " 140 (H) 02/09/2024    HDL 66.0 02/09/2024     Lab Results   Component Value Date    HGBA1C 9.0 (H) 02/09/2024     The 10-year ASCVD risk score (Yamilet CASPER, et al., 2019) is: 29.7%    Values used to calculate the score:      Age: 68 years      Sex: Female      Is Non- : Yes      Diabetic: Yes      Tobacco smoker: No      Systolic Blood Pressure: 142 mmHg      Is BP treated: Yes      HDL Cholesterol: 66 mg/dL      Total Cholesterol: 222 mg/dL      Assessment/Plan     Problem List Items Addressed This Visit       Diabetes mellitus type 2 without retinopathy (Multi)     CONTINUE  Lantus Solostar 100 units/mL - 25 units nightly   Glipizide 10 mg - 1 tablet by mouth twice daily     Patient remains open to starting SGLT-2 inhibitor pending VAF approval          Patient is not comfortable with any increase in insulin dose at this time. Patient is open to addition of SGLT-2 if cost is no longer a concern. Discussed lifestyle changes at this time.     Patient Assistance Screening (VAF)    Patient verbally reports monthly or yearly income which is less than 400% federal poverty level    Application for program will be submitted for the following medications: SGLT-2 inhibitor    Patient aware that they will need to provide appropriate proof of income documents: Statement of non-filing and SSB/Pension statement - email sent to patient again     Patient aware this process may take up to 2-4 weeks from time of submission and application will be submitted upon receipt of income documents     If approved, medication must be filled through [facility] pharmacy and may be picked up or mailed to patient.       Interested in Dexcom G7 - sent through [facility] [facility]- states daughter will help her place and start using.     Type 2 diabetes mellitus, is not at goal. Goal A1C: <7%    Follow up: I recommend diabetes care be 4 weeks.    Ashley Ko PharmD [facility]  Clinical Pharmacy Specialist, Primary  Care     Continue all meds under the continuation of care with the referring provider and clinical pharmacy team

## 2024-05-23 ENCOUNTER — OFFICE VISIT (OUTPATIENT)
Dept: OPHTHALMOLOGY | Facility: CLINIC | Age: 69
End: 2024-05-23
Payer: MEDICARE

## 2024-05-23 DIAGNOSIS — H35.30 ARMD (AGE-RELATED MACULAR DEGENERATION), BILATERAL: ICD-10-CM

## 2024-05-23 DIAGNOSIS — H52.12 MYOPIA OF LEFT EYE: ICD-10-CM

## 2024-05-23 DIAGNOSIS — E11.36 TYPE 2 DIABETES MELLITUS WITH DIABETIC CATARACT, UNSPECIFIED WHETHER LONG TERM INSULIN USE (MULTI): Primary | ICD-10-CM

## 2024-05-23 DIAGNOSIS — H52.223 REGULAR ASTIGMATISM OF BOTH EYES: ICD-10-CM

## 2024-05-23 DIAGNOSIS — H52.4 PRESBYOPIA: ICD-10-CM

## 2024-05-23 DIAGNOSIS — H53.8 BLURRED VISION: ICD-10-CM

## 2024-05-23 DIAGNOSIS — H25.813 COMBINED FORMS OF AGE-RELATED CATARACT OF BOTH EYES: ICD-10-CM

## 2024-05-23 PROCEDURE — 92015 DETERMINE REFRACTIVE STATE: CPT | Performed by: OPHTHALMOLOGY

## 2024-05-23 PROCEDURE — 92004 COMPRE OPH EXAM NEW PT 1/>: CPT | Performed by: OPHTHALMOLOGY

## 2024-05-23 ASSESSMENT — REFRACTION_MANIFEST
OS_AXIS: 080
OD_AXIS: 100
OD_CYLINDER: -0.75
OS_SPHERE: -0.75
OS_CYLINDER: -0.75
OS_ADD: +2.50
OD_ADD: +2.50
OD_SPHERE: PLANO

## 2024-05-23 ASSESSMENT — VISUAL ACUITY
OS_PH_SC: 20/25
OD_SC: 20/20-2
METHOD: SNELLEN - LINEAR
OS_SC: 20/50

## 2024-05-23 ASSESSMENT — SLIT LAMP EXAM - LIDS
COMMENTS: GOOD POSITION
COMMENTS: GOOD POSITION

## 2024-05-23 ASSESSMENT — TONOMETRY
IOP_METHOD: GOLDMANN APPLANATION
OD_IOP_MMHG: 19
OS_IOP_MMHG: 20

## 2024-05-23 ASSESSMENT — EXTERNAL EXAM - LEFT EYE: OS_EXAM: NORMAL

## 2024-05-23 ASSESSMENT — CUP TO DISC RATIO
OD_RATIO: .35
OS_RATIO: .35

## 2024-05-23 ASSESSMENT — EXTERNAL EXAM - RIGHT EYE: OD_EXAM: NORMAL

## 2024-05-23 NOTE — PROGRESS NOTES
Assessment/Plan   Diagnoses and all orders for this visit:  Type 2 diabetes mellitus with diabetic cataract, unspecified whether long term insulin use (Multi)  -no evidence of diabetic retinopathy at the present time  -pt was advised of the importance of good diabetes control and the importance of a yearly dilated diabetic exam  -pt was referred back to PCP for better diabetes control    Combined forms of age-related cataract of both eyes  Not visually significant at the present time  continue to monitor    Early age-related macular degeneration (ARMD)  continue to monitor    Presbyopia  Blurred vision  Myopia of left eye  Regular astigmatism of both eyes  Refractive error  -give Rx for new glasses    Return for a dilated exam in   12  months or sooner if having any problems

## 2024-07-02 ENCOUNTER — LAB (OUTPATIENT)
Dept: LAB | Facility: LAB | Age: 69
End: 2024-07-02
Payer: MEDICARE

## 2024-07-02 DIAGNOSIS — E11.9 DIABETES MELLITUS TYPE 2 WITHOUT RETINOPATHY (MULTI): ICD-10-CM

## 2024-07-02 DIAGNOSIS — E55.9 VITAMIN D DEFICIENCY: ICD-10-CM

## 2024-07-02 DIAGNOSIS — Z78.0 ASYMPTOMATIC MENOPAUSAL STATE: ICD-10-CM

## 2024-07-02 DIAGNOSIS — E78.5 HYPERLIPIDEMIA, UNSPECIFIED HYPERLIPIDEMIA TYPE: ICD-10-CM

## 2024-07-02 LAB
25(OH)D3 SERPL-MCNC: 7 NG/ML (ref 30–100)
CHOLEST SERPL-MCNC: 210 MG/DL (ref 0–199)
CHOLESTEROL/HDL RATIO: 4.2
EST. AVERAGE GLUCOSE BLD GHB EST-MCNC: 177 MG/DL
HBA1C MFR BLD: 7.8 %
HDLC SERPL-MCNC: 50 MG/DL
LDLC SERPL CALC-MCNC: 134 MG/DL
NON HDL CHOLESTEROL: 160 MG/DL (ref 0–149)
TRIGL SERPL-MCNC: 128 MG/DL (ref 0–149)
VLDL: 26 MG/DL (ref 0–40)

## 2024-07-02 PROCEDURE — 82306 VITAMIN D 25 HYDROXY: CPT

## 2024-07-02 PROCEDURE — 36415 COLL VENOUS BLD VENIPUNCTURE: CPT

## 2024-07-02 PROCEDURE — 80061 LIPID PANEL: CPT

## 2024-07-02 PROCEDURE — 83036 HEMOGLOBIN GLYCOSYLATED A1C: CPT

## 2024-07-11 ENCOUNTER — APPOINTMENT (OUTPATIENT)
Dept: PRIMARY CARE | Facility: CLINIC | Age: 69
End: 2024-07-11
Payer: MEDICARE

## 2024-07-11 VITALS
WEIGHT: 182 LBS | HEIGHT: 67 IN | SYSTOLIC BLOOD PRESSURE: 164 MMHG | BODY MASS INDEX: 28.56 KG/M2 | OXYGEN SATURATION: 98 % | DIASTOLIC BLOOD PRESSURE: 74 MMHG | HEART RATE: 83 BPM

## 2024-07-11 DIAGNOSIS — E11.22 TYPE 2 DIABETES MELLITUS WITH STAGE 3A CHRONIC KIDNEY DISEASE, WITH LONG-TERM CURRENT USE OF INSULIN (MULTI): Chronic | ICD-10-CM

## 2024-07-11 DIAGNOSIS — Z79.4 TYPE 2 DIABETES MELLITUS WITH STAGE 3A CHRONIC KIDNEY DISEASE, WITH LONG-TERM CURRENT USE OF INSULIN (MULTI): Chronic | ICD-10-CM

## 2024-07-11 DIAGNOSIS — E55.9 VITAMIN D DEFICIENCY: Primary | ICD-10-CM

## 2024-07-11 DIAGNOSIS — I10 HYPERTENSION, UNSPECIFIED TYPE: ICD-10-CM

## 2024-07-11 DIAGNOSIS — N18.31 TYPE 2 DIABETES MELLITUS WITH STAGE 3A CHRONIC KIDNEY DISEASE, WITH LONG-TERM CURRENT USE OF INSULIN (MULTI): Chronic | ICD-10-CM

## 2024-07-11 DIAGNOSIS — I10 PRIMARY HYPERTENSION: Primary | ICD-10-CM

## 2024-07-11 PROCEDURE — 3077F SYST BP >= 140 MM HG: CPT | Performed by: STUDENT IN AN ORGANIZED HEALTH CARE EDUCATION/TRAINING PROGRAM

## 2024-07-11 PROCEDURE — 3008F BODY MASS INDEX DOCD: CPT | Performed by: STUDENT IN AN ORGANIZED HEALTH CARE EDUCATION/TRAINING PROGRAM

## 2024-07-11 PROCEDURE — 3078F DIAST BP <80 MM HG: CPT | Performed by: STUDENT IN AN ORGANIZED HEALTH CARE EDUCATION/TRAINING PROGRAM

## 2024-07-11 PROCEDURE — 4010F ACE/ARB THERAPY RXD/TAKEN: CPT | Performed by: STUDENT IN AN ORGANIZED HEALTH CARE EDUCATION/TRAINING PROGRAM

## 2024-07-11 PROCEDURE — 3050F LDL-C >= 130 MG/DL: CPT | Performed by: STUDENT IN AN ORGANIZED HEALTH CARE EDUCATION/TRAINING PROGRAM

## 2024-07-11 PROCEDURE — 99214 OFFICE O/P EST MOD 30 MIN: CPT | Performed by: STUDENT IN AN ORGANIZED HEALTH CARE EDUCATION/TRAINING PROGRAM

## 2024-07-11 PROCEDURE — 3051F HG A1C>EQUAL 7.0%<8.0%: CPT | Performed by: STUDENT IN AN ORGANIZED HEALTH CARE EDUCATION/TRAINING PROGRAM

## 2024-07-11 RX ORDER — CHOLECALCIFEROL (VITAMIN D3) 50 MCG
50 TABLET ORAL DAILY
Qty: 90 TABLET | Refills: 3 | Status: SHIPPED | OUTPATIENT
Start: 2024-07-11 | End: 2025-07-11

## 2024-07-11 RX ORDER — CHLORTHALIDONE 25 MG/1
25 TABLET ORAL DAILY
Qty: 90 TABLET | Refills: 3 | Status: SHIPPED | OUTPATIENT
Start: 2024-07-11 | End: 2025-07-11

## 2024-07-11 NOTE — PATIENT INSTRUCTIONS
Thank you for coming today Tammy!    Please get your shingrix vaccine at the pharmacy.     Please increase your chlorthalidone to a full tablet daily.    We should consider changing your diabetes medications around since it is difficult to take glipizide twice a day.    I will have the pharmacist reach out to you about diabetes and blood pressure medications!    Please schedule your Medicare wellness in October, and we will follow up on your blood pressure then as well!

## 2024-07-11 NOTE — PROGRESS NOTES
Subjective   Patient ID: Tammy Becerra is a 69 y.o. female with HTN, poorly controlled T2DM on insulin, HLD, prior CVA in 2019 on DAPT, GERD who presents for Follow-up      HPI  Today, reports feeling fine. States the lipoma is still present on her back and has not changed in sized. Denies any pain or irritation in the area. She has an US appointment scheduled for 7/18. Her mammogram is also scheduled for 7/18. She is checking her sugars at home and it ranges from 160-200s. Only taking Glipizide daily on average. Started checking her blood pressures at home and usually is around SBP 150s. She denies any chest pain, SOB, abdominal pain or nausea/vomiting.     Social History:  Retired  Lives with partner  Rarely exercises   Stopped smoking 2007, 30 pack year history  Lives in [place]    FH:  - DM, HTN in father, mother  - No cancer     Objective     Vitals:    07/11/24 1306   BP: 164/74   Pulse: 83   SpO2: 98%     Physical Exam:  General: well appearing, NAD, pleasant  HEENT: NCAT, MMM  CV: RRR, no m/r/g  PULM: CTAB, non-labored respirations   ABD: soft, NT, ND, + bowel sounds   EXT:trace LE edema   SKIN: no rashes noted   NEURO: A&Ox4, no gross motor or sensory deficits   PSYCH: pleasant mood, appropriate affect      Assessment/Plan   Tammy Becerra is a 68 y.o. female with HTN, poorly controlled T2DM on insulin, HLD, prior CVA in 2019 on DAPT, GERD who presents for Follow-up.     #Mass on back  -Possibly a lipoma   -US ordered in March, still pending and scheduled for 7/18    #HTN  -/74, recheck 150/78   -Taking lisinopril 40, amlo 10, and chlorthalidone 12.5   -Will increase to Chlorthalidone 25 mg daily     #HLD  -On Atorvastatin 40, although reports she does forget to take it periodically     #DM II  -A1c 7.8  -Taking insulin 25 at night, glipizide 10mg daily    -Did not take GLP1 or SGLT2i due to cost  -Will consider switching Glipizide at next visit   -[facility] pharmacy following, appreciate recs    #CVA  hx  -Taking DAPT    #Low Vit D  -Start Vit D supplement     Health Maintenance   Shingles: discussed getting shingrix vaccine at local pharmacy   PNA: 2018   Pap: last PAP/HPV testing in 2018, due to repeat   Mammogram: scheduled for 7/18  DEXA Scan: scheduled for 7/18     RTC in 3-4 months     Pt seen and discussed with Dr. Madison Flores   Internal Medicine, PGY3

## 2024-07-18 ENCOUNTER — HOSPITAL ENCOUNTER (OUTPATIENT)
Dept: RADIOLOGY | Facility: CLINIC | Age: 69
Discharge: HOME | End: 2024-07-18
Payer: MEDICARE

## 2024-07-18 VITALS — WEIGHT: 183 LBS | BODY MASS INDEX: 28.72 KG/M2 | HEIGHT: 67 IN

## 2024-07-18 DIAGNOSIS — Z78.0 MENOPAUSE: ICD-10-CM

## 2024-07-18 DIAGNOSIS — Z12.31 ENCOUNTER FOR SCREENING MAMMOGRAM FOR MALIGNANT NEOPLASM OF BREAST: ICD-10-CM

## 2024-07-18 DIAGNOSIS — R22.2 MASS OF SUBCUTANEOUS TISSUE OF BACK: ICD-10-CM

## 2024-07-18 PROCEDURE — 76536 US EXAM OF HEAD AND NECK: CPT

## 2024-07-18 PROCEDURE — 77063 BREAST TOMOSYNTHESIS BI: CPT

## 2024-07-18 PROCEDURE — 77080 DXA BONE DENSITY AXIAL: CPT

## 2024-07-18 ASSESSMENT — LIFESTYLE VARIABLES
3_OR_MORE_DRINKS_PER_DAY: N
CURRENT_SMOKER: N

## 2024-07-22 ENCOUNTER — TELEPHONE (OUTPATIENT)
Dept: PRIMARY CARE | Facility: CLINIC | Age: 69
End: 2024-07-22

## 2024-08-13 NOTE — PROGRESS NOTES
Subjective   Patient ID: Tammy Becerra is a 69 y.o. female who presents for No chief complaint on file..    Silvia Wallace MD MPH, type 2 diabetes  T2DM A1c 7.8%, goal <7%  Readings     Past meds: metformin-GI issues, trulicity- GI issues  Current Meds  Lantus solostar 100 units/mL inj 25 units nightly are you taking  Glipizide 10 mg BID     Secondary prevention:   Atorvastatin 40 mg daily  Lisinopril 40 mg daily- past hx     Foot exam:  Eye exam:   Lipid panel: cholesterol 210, , nonHDL cholesterol 160  Diet:   Exercise    Monitoring  Dexcom is it on     Lows  highs    HTN  /74  Home readings    Medications  Lisinopril 40 mg daily  Chlorathalidone 25 mg daily  Amlodipine 10 mg    Objective       Assessment/Plan   {Assess/PlanSmartLinks:30108}         ALE IZQUIERDO 08/13/24 1:42 PM

## 2024-08-16 ENCOUNTER — APPOINTMENT (OUTPATIENT)
Dept: PHARMACY | Facility: HOSPITAL | Age: 69
End: 2024-08-16
Payer: MEDICARE

## 2024-08-16 DIAGNOSIS — E11.22 TYPE 2 DIABETES MELLITUS WITH STAGE 3A CHRONIC KIDNEY DISEASE, WITH LONG-TERM CURRENT USE OF INSULIN (MULTI): Chronic | ICD-10-CM

## 2024-08-16 DIAGNOSIS — N18.31 TYPE 2 DIABETES MELLITUS WITH STAGE 3A CHRONIC KIDNEY DISEASE, WITH LONG-TERM CURRENT USE OF INSULIN (MULTI): Chronic | ICD-10-CM

## 2024-08-16 DIAGNOSIS — Z79.4 TYPE 2 DIABETES MELLITUS WITH STAGE 3A CHRONIC KIDNEY DISEASE, WITH LONG-TERM CURRENT USE OF INSULIN (MULTI): Chronic | ICD-10-CM

## 2024-08-16 DIAGNOSIS — I10 PRIMARY HYPERTENSION: ICD-10-CM

## 2024-08-16 NOTE — ASSESSMENT & PLAN NOTE
Tolerating increased dose of chlorthalidone well. Will start testing BP at home and recording readings. Discussed proper technique.

## 2024-08-16 NOTE — PROGRESS NOTES
"  Clinical Pharmacy Appointment  Subjective     Patient ID: Tammy Becerra is a 69 y.o. female who presents for Diabetes.    Referring Provider: Silvia Wallace MD M*     Diabetes  She presents for her follow-up diabetic visit. She has type 2 diabetes mellitus. Her disease course has been improving. She is compliant with treatment most of the time.       No Known Allergies    Objective     Current DM Pharmacotherapy:   Lantus Solostar - 25 units nightly   Glipizide 10 mg - 1 tablet twice daily       Clarifications to above regimen: skips Glipizide is BG \"low\" generally less than 150   Adverse Effects: None    SECONDARY PREVENTION  - Statin? Yes, Atorvastatin 40  - ACEi/ARB? Yes, Lisinopril 40  - Aspirin? Yes    Current monitoring regimen:   Patient is using: glucometer    Testing frequency: 2-3 times daily    SMBG Readings: Always below 200 mg/dL - sometimes as low as 60 mg/dL generally before dinner when she has not eaten all day    Any episodes of hypoglycemia? Yes.  Did patient treat episode of hypoglycemia appropriately? N/A    Pertinent PMH Review:  - PMH of Pancreatitis: No  - PMH/FH of Medullary Thyroid Cancer: No  - PMH/FH of Multiple Endocrine Neoplasia (MEN) Type II: No  - PMH of Retinopathy: No  - PMH of Urinary Tract Infections/Yeast Infections: No    Lab Review  BMP  Lab Results   Component Value Date    CALCIUM 9.4 02/09/2024     02/09/2024    K 3.9 02/09/2024    CO2 28 02/09/2024     02/09/2024    BUN 25 (H) 02/09/2024    CREATININE 1.27 (H) 02/09/2024    EGFR 46 (L) 02/09/2024     LFTs  Lab Results   Component Value Date    ALT 8 02/09/2024    AST 11 02/09/2024    ALKPHOS 90 02/09/2024    BILITOT 0.3 02/09/2024     FLP  Lab Results   Component Value Date    TRIG 128 07/02/2024    CHOL 210 (H) 07/02/2024    LDLF 113 (H) 04/26/2022    LDLCALC 134 (H) 07/02/2024    HDL 50.0 07/02/2024       The 10-year ASCVD risk score (Yamilet DK, et al., 2019) is: 38.7%    Values used to calculate the " score:      Age: 69 years      Sex: Female      Is Non- : Yes      Diabetic: Yes      Tobacco smoker: No      Systolic Blood Pressure: 164 mmHg      Is BP treated: Yes      HDL Cholesterol: 50 mg/dL      Total Cholesterol: 210 mg/dL  Urine Microalbumin  Lab Results   Component Value Date    MICROALBCREA 26.7 09/14/2023     Weight Management  Wt Readings from Last 3 Encounters:   07/18/24 83 kg (183 lb)   07/11/24 82.6 kg (182 lb)   03/28/24 82.1 kg (181 lb)      There is no height or weight on file to calculate BMI.   A1C  Lab Results   Component Value Date    HGBA1C 7.8 (H) 07/02/2024    HGBA1C 9.0 (H) 02/09/2024    HGBA1C 9.5 (A) 09/14/2023         Assessment/Plan     Problem List Items Addressed This Visit       HTN (hypertension)     Tolerating increased dose of chlorthalidone well. Will start testing BP at home and recording readings. Discussed proper technique.          DM (diabetes mellitus), type 2 (Multi) (Chronic)     Lost Dexcom sensors, will call to get refill and have daughter help her set up with her phone for a reader. Instructed to hold glipizide if BG <100 mg/dL. Stil lagreeable to switching Glipizide to Jardiance if approved for  PAP.     Continue   Lantus Solostar - 25 units nightly   Glipizide 10 mg - 1 tablet twice daily            Patient Assistance Screening (VAF)    Patient verbally reports monthly or yearly income which is less than 400% federal poverty level    Application for program will be submitted for the following medications: Lantus, Jardiance    Patient aware that they will need to provide appropriate proof of income documents: SSB statement/Pension statement and Statement of non-filing - email send for 3rd time    Patient aware this process may take up to 2-4 weeks from time of submission and application will be submitted upon receipt of income documents     If approved, medication must be filled through [facility] pharmacy and may be picked up or mailed  to patient. If approved, medication will be billed through insurance, and patient assistance team will pay the copay. This will result in a $0 copay for the patient.    Type 2 diabetes mellitus, is not at goal. Goal A1C: <7%    Follow up: I recommend diabetes care be 2 weeks.    Ashley McculloughD [facility]  Clinical Pharmacy Specialist, Primary Care     Continue all meds under the continuation of care with the referring provider and clinical pharmacy team

## 2024-08-16 NOTE — ASSESSMENT & PLAN NOTE
Lost Dexcom sensors, will call to get refill and have daughter help her set up with her phone for a reader. Instructed to hold glipizide if BG <100 mg/dL. Stil lagreeable to switching Glipizide to Jardiance if approved for  PAP.     Continue   Lantus Solostar - 25 units nightly   Glipizide 10 mg - 1 tablet twice daily

## 2024-08-29 ENCOUNTER — APPOINTMENT (OUTPATIENT)
Dept: PHARMACY | Facility: HOSPITAL | Age: 69
End: 2024-08-29
Payer: MEDICARE

## 2024-09-06 DIAGNOSIS — I63.9 CEREBRAL INFARCTION, UNSPECIFIED (MULTI): ICD-10-CM

## 2024-09-06 RX ORDER — CLOPIDOGREL BISULFATE 75 MG/1
75 TABLET ORAL DAILY
Qty: 90 TABLET | Refills: 0 | Status: SHIPPED | OUTPATIENT
Start: 2024-09-06

## 2024-10-10 DIAGNOSIS — E11.9 DIABETES MELLITUS TYPE 2 WITHOUT RETINOPATHY (MULTI): ICD-10-CM

## 2024-10-11 RX ORDER — INSULIN GLARGINE 100 [IU]/ML
INJECTION, SOLUTION SUBCUTANEOUS
Qty: 9 ML | Refills: 3 | Status: SHIPPED | OUTPATIENT
Start: 2024-10-11

## 2024-11-07 ENCOUNTER — APPOINTMENT (OUTPATIENT)
Dept: PRIMARY CARE | Facility: CLINIC | Age: 69
End: 2024-11-07
Payer: MEDICARE

## 2024-12-30 DIAGNOSIS — I63.9 CEREBRAL INFARCTION, UNSPECIFIED: ICD-10-CM

## 2024-12-30 RX ORDER — CLOPIDOGREL BISULFATE 75 MG/1
75 TABLET ORAL DAILY
Qty: 90 TABLET | Refills: 0 | Status: SHIPPED | OUTPATIENT
Start: 2024-12-30

## 2025-03-17 DIAGNOSIS — I10 HYPERTENSION, UNSPECIFIED TYPE: ICD-10-CM

## 2025-03-17 RX ORDER — AMLODIPINE BESYLATE 10 MG/1
10 TABLET ORAL DAILY
Qty: 90 TABLET | Refills: 3 | Status: SHIPPED | OUTPATIENT
Start: 2025-03-17

## 2025-04-05 DIAGNOSIS — I63.9 CEREBRAL INFARCTION, UNSPECIFIED: ICD-10-CM

## 2025-04-07 RX ORDER — CLOPIDOGREL BISULFATE 75 MG/1
75 TABLET ORAL DAILY
Qty: 90 TABLET | Refills: 0 | Status: SHIPPED | OUTPATIENT
Start: 2025-04-07 | End: 2025-04-10 | Stop reason: SDUPTHER

## 2025-04-10 ENCOUNTER — APPOINTMENT (OUTPATIENT)
Dept: PRIMARY CARE | Facility: CLINIC | Age: 70
End: 2025-04-10
Payer: MEDICARE

## 2025-04-10 VITALS
BODY MASS INDEX: 28.38 KG/M2 | SYSTOLIC BLOOD PRESSURE: 148 MMHG | OXYGEN SATURATION: 93 % | HEART RATE: 67 BPM | WEIGHT: 181.2 LBS | DIASTOLIC BLOOD PRESSURE: 70 MMHG

## 2025-04-10 DIAGNOSIS — I10 HYPERTENSION, UNSPECIFIED TYPE: ICD-10-CM

## 2025-04-10 DIAGNOSIS — E55.9 VITAMIN D DEFICIENCY: ICD-10-CM

## 2025-04-10 DIAGNOSIS — E11.9 TYPE 2 DIABETES MELLITUS WITHOUT COMPLICATIONS: ICD-10-CM

## 2025-04-10 DIAGNOSIS — E11.9 TYPE 2 DIABETES MELLITUS WITHOUT COMPLICATION, WITH LONG-TERM CURRENT USE OF INSULIN: Primary | ICD-10-CM

## 2025-04-10 DIAGNOSIS — E78.5 HYPERLIPIDEMIA, UNSPECIFIED HYPERLIPIDEMIA TYPE: ICD-10-CM

## 2025-04-10 DIAGNOSIS — Z79.4 TYPE 2 DIABETES MELLITUS WITHOUT COMPLICATION, WITH LONG-TERM CURRENT USE OF INSULIN: Primary | ICD-10-CM

## 2025-04-10 DIAGNOSIS — Z12.31 ENCOUNTER FOR SCREENING MAMMOGRAM FOR MALIGNANT NEOPLASM OF BREAST: ICD-10-CM

## 2025-04-10 DIAGNOSIS — I63.9 CEREBRAL INFARCTION, UNSPECIFIED: ICD-10-CM

## 2025-04-10 PROCEDURE — 99214 OFFICE O/P EST MOD 30 MIN: CPT | Performed by: STUDENT IN AN ORGANIZED HEALTH CARE EDUCATION/TRAINING PROGRAM

## 2025-04-10 PROCEDURE — G2211 COMPLEX E/M VISIT ADD ON: HCPCS | Performed by: STUDENT IN AN ORGANIZED HEALTH CARE EDUCATION/TRAINING PROGRAM

## 2025-04-10 PROCEDURE — 1159F MED LIST DOCD IN RCRD: CPT | Performed by: STUDENT IN AN ORGANIZED HEALTH CARE EDUCATION/TRAINING PROGRAM

## 2025-04-10 PROCEDURE — 3077F SYST BP >= 140 MM HG: CPT | Performed by: STUDENT IN AN ORGANIZED HEALTH CARE EDUCATION/TRAINING PROGRAM

## 2025-04-10 PROCEDURE — 3078F DIAST BP <80 MM HG: CPT | Performed by: STUDENT IN AN ORGANIZED HEALTH CARE EDUCATION/TRAINING PROGRAM

## 2025-04-10 PROCEDURE — 1126F AMNT PAIN NOTED NONE PRSNT: CPT | Performed by: STUDENT IN AN ORGANIZED HEALTH CARE EDUCATION/TRAINING PROGRAM

## 2025-04-10 PROCEDURE — 4010F ACE/ARB THERAPY RXD/TAKEN: CPT | Performed by: STUDENT IN AN ORGANIZED HEALTH CARE EDUCATION/TRAINING PROGRAM

## 2025-04-10 RX ORDER — GLIPIZIDE 10 MG/1
10 TABLET ORAL 2 TIMES DAILY
Qty: 180 TABLET | Refills: 3 | Status: SHIPPED | OUTPATIENT
Start: 2025-04-10 | End: 2026-04-10

## 2025-04-10 RX ORDER — ROSUVASTATIN CALCIUM 20 MG/1
20 TABLET, COATED ORAL DAILY
Qty: 90 TABLET | Refills: 3 | Status: SHIPPED | OUTPATIENT
Start: 2025-04-10 | End: 2026-04-10

## 2025-04-10 RX ORDER — LISINOPRIL 40 MG/1
40 TABLET ORAL DAILY
Qty: 90 TABLET | Refills: 3 | Status: SHIPPED | OUTPATIENT
Start: 2025-04-10 | End: 2026-04-10

## 2025-04-10 RX ORDER — CLOPIDOGREL BISULFATE 75 MG/1
75 TABLET ORAL DAILY
Qty: 90 TABLET | Refills: 3 | Status: SHIPPED | OUTPATIENT
Start: 2025-04-10

## 2025-04-10 RX ORDER — INSULIN LISPRO 100 [IU]/ML
INJECTION, SOLUTION INTRAVENOUS; SUBCUTANEOUS
Qty: 10 ML | Refills: 11 | Status: SHIPPED | OUTPATIENT
Start: 2025-04-10 | End: 2025-04-12

## 2025-04-10 RX ORDER — CHLORTHALIDONE 50 MG/1
50 TABLET ORAL DAILY
Qty: 90 TABLET | Refills: 3 | Status: SHIPPED | OUTPATIENT
Start: 2025-04-10 | End: 2026-04-10

## 2025-04-10 ASSESSMENT — ENCOUNTER SYMPTOMS
LOSS OF SENSATION IN FEET: 0
DEPRESSION: 0
OCCASIONAL FEELINGS OF UNSTEADINESS: 0

## 2025-04-10 ASSESSMENT — PAIN SCALES - GENERAL: PAINLEVEL_OUTOF10: 0-NO PAIN

## 2025-04-10 NOTE — PATIENT INSTRUCTIONS
Thank you for coming today Tammy!    Please schedule your mammogram in July. [phone].     Please get your blood work. You can get this at any  lab, the one here is on the lower level in [address].     Please increase your chlorthalidone to 50mg daily.     Let's switch your statin to rosuvastatin 20mg daily and STOP atorvastatin.     For your high blood sugar, let's start a sliding scale of lispro insulin before meals three times a day.   For 200-250-- take 2 units  251-300-- take 3 units  301-350-- take 4 units  351-400-- take 5 units     I will have Ashley follow up with you from pharmacy.

## 2025-04-10 NOTE — PROGRESS NOTES
Subjective   Patient ID: Tammy Becerra is a 69 y.o. female with HTN, poorly controlled T2DM on insulin, HLD, prior CVA in 2019 on DAPT, GERD who presents for Follow-up       HPI  No new acute concerns today, following up on prior concerns:    Chronic issues:  #Lipoma  -Mass is still getting bigger and feels pressure from it when lying flat, still painless  -Has some numbness of L fingers  -Ultrasound 7/2024 consistent with lipoma    #Nausea, improved  -Self discontinued omeprazole   -Not having symptoms as bad in the morning, making dietary modifications (avoid chocolate, soda) and indigestion resolves by mid-day    #HTN  -More elevated today, /73, manual recheck 148/70. Trace bilateral LE edema  -BP at home: 150s systolic   -Taking lisinopril 40 and amlo 10 and chlorthalidone 25 mg daily  -Plan to increase chlorthalidone to 50mg    #HLD  -On atorvastatin 40mg, does not always take regularly has some side effects (itching, muscle weakness)  - Change to rosuvastatin 20mg to see if better tolerated    #DM II  -Still uncontrolled, a little better-- down to A1c of 7.8 in 7/2024  -Taking insulin 25 at night, glipizide 10mg BID   - START lispro SSI (0-5 units) TID with meals   -Measures BG TID usually in the 200s to 300s; does have symptoms (headaches) with BG in the 100s   -Did not take GLP1 or SGLT2i due to cost  -[facility] pharmacy no longer followingy, will place referral  -No numbness in feet, has some unsteadiness and stiffness of feet  -Sees eye doctor regularly  -Would recommend DEXCOM    #CVA hx  -Taking DAPT    #Vitamin D deficiency   -On Vit D supplementation    Social History:  Retired  Lives with partner  Rarely exercises   Stopped smoking 2007, 30 pack year history  Lives in [place]    HM:   Cancer Screening  - Mammography: due July and ordered   - Colorectal Cancer Screening: Last cscope 2018     Imaging Screening  - Osteoporosis/DEXA screening: due and ordered   - CT lung cancer screening not indicated  as she quit over 15 years ago     Immunizations:   - Influenza last 2022  - COVID Pfizer x 2 in 2021  - Pneumovax 23 in 2018, unclear if got prevnar, would discuss this on next visit  - Shingrix due    FH:  - DM, HTN in father, mother  - No cancer       Objective     Vitals:    04/10/25 1004   BP: 162/73   Pulse: 67   SpO2: 93%      Exam:   General: Well appearing, conversational, in no acute distress  HEENT: EOMI, PERRL, nares patent without congestion, MMM  CV: RRR, no murmurs  Resp: Lungs CTAB, normal work of breathing  GI: Soft, nondistended, nontender  Skin: L thoracic area with 8x5cm subcutaneous soft mass, nontender, larger than last visit  Neuro: Awake, alert, oriented x3, moving all 4 extremities, nonfocal, normal gait, ambulates without assistance  Psych: Appropriate mood and affect      Assessment/Plan   Tammy Becerra is a 68 y.o. female with HTN, poorly controlled T2DM on insulin, HLD, prior CVA in 2019 on DAPT, GERD who presents for Follow-up, with BP elevated this visit and BG at home in the 200s-300s. Mass on back is a lipoma, will continue to monitor.       -START lispro sliding scale  -Increase chlorthalidone to 50mg daily  -Order mammogram for screening  -Order annual screening labs    Patient was seen and discussed with attending Dr. Madison Byers MD  Internal Medicine and Pediatrics, PGY3

## 2025-05-06 DIAGNOSIS — E11.9 DIABETES MELLITUS TYPE 2 WITHOUT RETINOPATHY (MULTI): ICD-10-CM

## 2025-05-07 RX ORDER — INSULIN GLARGINE 100 [IU]/ML
100 INJECTION, SOLUTION SUBCUTANEOUS NIGHTLY
Qty: 9 ML | Refills: 3 | Status: SHIPPED | OUTPATIENT
Start: 2025-05-07

## 2025-05-09 ENCOUNTER — TELEMEDICINE (OUTPATIENT)
Dept: PHARMACY | Facility: HOSPITAL | Age: 70
End: 2025-05-09
Payer: MEDICARE

## 2025-05-09 DIAGNOSIS — Z79.4 TYPE 2 DIABETES MELLITUS WITHOUT COMPLICATION, WITH LONG-TERM CURRENT USE OF INSULIN: ICD-10-CM

## 2025-05-09 DIAGNOSIS — E11.9 TYPE 2 DIABETES MELLITUS WITHOUT COMPLICATION, WITH LONG-TERM CURRENT USE OF INSULIN: ICD-10-CM

## 2025-05-09 RX ORDER — INSULIN LISPRO 100 [IU]/ML
INJECTION, SOLUTION INTRAVENOUS; SUBCUTANEOUS
Qty: 15 ML | Refills: 2 | Status: SHIPPED | OUTPATIENT
Start: 2025-05-09

## 2025-05-09 NOTE — ASSESSMENT & PLAN NOTE
Patient's goal A1c is < 7%.  Is pt at goal? No, 7.8%  Patient's SMBGs are elevated.     Rationale for plan: Never received SSI Rx from last PCP visit, will resend today.    Medication Changes:  CONTINUE  Lantus Solostar - 25 units nightly   Glipizide 10 mg - 1 tablet twice daily   START  Insulin lispro SSI (0-5 units) TID with meals   - 200-250 take 2 units, 251-300 take 3 units, 301-350 take 4 units, 351-400 take 5 units     Future Considerations:  Insulin adjustments as tolerated- pt reports hypoglycemia symptoms any lower than 160  mg/dL

## 2025-05-09 NOTE — PROGRESS NOTES
Clinical Pharmacy Appointment  Subjective     Patient ID: Tammy Becerra is a 69 y.o. female who presents for Diabetes.    Referring Provider: Silvia Wallace MD M*     DIABETES MELLITUS TYPE 2:    Does patient follow with Endocrinology: No    Diet: Fish (breaded), chicken with BBQ sauce, cauliflower, broccoli, green beans, corn. A lot of rice, pasta, and bread- Water - no pop or juice       No Known Allergies    Objective     Current DM Pharmacotherapy:   Lantus Solostar - 25 units nightly   Glipizide 10 mg - 1 tablet twice daily   Insulin lispro SSI (0-5 units) TID with meals   - 200-250 take 2 units, 251-300 take 3 units, 301-350 take 4 units, 351-400 take 5 units       Clarifications to above regimen: Never received SSI   Adverse Effects: None    SECONDARY PREVENTION  - Statin? Yes, Atorvastatin 40  - ACEi/ARB? Yes, Lisinopril 40  - Aspirin? Yes    Current monitoring regimen:   Patient is using: glucometer    Testing frequency: 2-3 times daily    SMBG Readings:     140-170 mg/dL - still goes up into 200s -     Any episodes of hypoglycemia? Yes.  Did patient treat episode of hypoglycemia appropriately? N/A    Pertinent PMH Review:  - PMH of Pancreatitis: No  - PMH/FH of Medullary Thyroid Cancer: No  - PMH/FH of Multiple Endocrine Neoplasia (MEN) Type II: No  - PMH of Retinopathy: No  - PMH of Urinary Tract Infections/Yeast Infections: No    Lab Review  BMP  Lab Results   Component Value Date    CALCIUM 9.4 02/09/2024     02/09/2024    K 3.9 02/09/2024    CO2 28 02/09/2024     02/09/2024    BUN 25 (H) 02/09/2024    CREATININE 1.27 (H) 02/09/2024    EGFR 46 (L) 02/09/2024     LFTs  Lab Results   Component Value Date    ALT 8 02/09/2024    AST 11 02/09/2024    ALKPHOS 90 02/09/2024    BILITOT 0.3 02/09/2024     FLP  Lab Results   Component Value Date    TRIG 128 07/02/2024    CHOL 210 (H) 07/02/2024    LDLF 113 (H) 04/26/2022    LDLCALC 134 (H) 07/02/2024    HDL 50.0 07/02/2024       The 10-year  ASCVD risk score (Yamilet CASPER, et al., 2019) is: 32.5%    Values used to calculate the score:      Age: 69 years      Sex: Female      Is Non- : Yes      Diabetic: Yes      Tobacco smoker: No      Systolic Blood Pressure: 148 mmHg      Is BP treated: Yes      HDL Cholesterol: 50 mg/dL      Total Cholesterol: 210 mg/dL  Urine Microalbumin  Lab Results   Component Value Date    MICROALBCREA 26.7 09/14/2023     Weight Management  Wt Readings from Last 3 Encounters:   04/10/25 82.2 kg (181 lb 3.2 oz)   07/18/24 83 kg (183 lb)   07/11/24 82.6 kg (182 lb)      There is no height or weight on file to calculate BMI.   A1C  Lab Results   Component Value Date    HGBA1C 7.8 (H) 07/02/2024    HGBA1C 9.0 (H) 02/09/2024    HGBA1C 9.5 (A) 09/14/2023         Assessment/Plan     Problem List Items Addressed This Visit       DM (diabetes mellitus), type 2 (Multi) (Chronic)    Patient's goal A1c is < 7%.  Is pt at goal? No, 7.8%  Patient's SMBGs are elevated.     Rationale for plan: Never received SSI Rx from last PCP visit, will resend today.    Medication Changes:  CONTINUE  Lantus Solostar - 25 units nightly   Glipizide 10 mg - 1 tablet twice daily   START  Insulin lispro SSI (0-5 units) TID with meals   - 200-250 take 2 units, 251-300 take 3 units, 301-350 take 4 units, 351-400 take 5 units     Future Considerations:  Insulin adjustments as tolerated- pt reports hypoglycemia symptoms any lower than 160  mg/dL                     Follow up: I recommend diabetes care be 2 weeks.    Ashley Ko PharmD [facility]  Clinical Pharmacy Specialist, Primary Care     Continue all meds under the continuation of care with the referring provider and clinical pharmacy team

## 2025-05-13 ENCOUNTER — PATIENT OUTREACH (OUTPATIENT)
Dept: CARE COORDINATION | Age: 70
End: 2025-05-13
Payer: MEDICARE

## 2025-05-16 ENCOUNTER — PATIENT OUTREACH (OUTPATIENT)
Dept: CARE COORDINATION | Age: 70
End: 2025-05-16
Payer: MEDICARE

## 2025-05-21 ENCOUNTER — PATIENT OUTREACH (OUTPATIENT)
Dept: CARE COORDINATION | Age: 70
End: 2025-05-21
Payer: MEDICARE

## 2025-05-22 ENCOUNTER — APPOINTMENT (OUTPATIENT)
Dept: PHARMACY | Facility: HOSPITAL | Age: 70
End: 2025-05-22
Payer: MEDICARE

## 2025-05-29 ENCOUNTER — APPOINTMENT (OUTPATIENT)
Dept: OPHTHALMOLOGY | Facility: CLINIC | Age: 70
End: 2025-05-29
Payer: MEDICARE

## 2025-05-29 DIAGNOSIS — E11.9 DIABETES MELLITUS TYPE 2 WITHOUT RETINOPATHY (MULTI): Primary | ICD-10-CM

## 2025-05-29 DIAGNOSIS — H25.813 COMBINED FORMS OF AGE-RELATED CATARACT OF BOTH EYES: ICD-10-CM

## 2025-05-29 DIAGNOSIS — H52.4 PRESBYOPIA: ICD-10-CM

## 2025-05-29 DIAGNOSIS — H52.12 MYOPIA OF LEFT EYE: ICD-10-CM

## 2025-05-29 DIAGNOSIS — H35.30 ARMD (AGE-RELATED MACULAR DEGENERATION), BILATERAL: ICD-10-CM

## 2025-05-29 DIAGNOSIS — H52.223 REGULAR ASTIGMATISM OF BOTH EYES: ICD-10-CM

## 2025-05-29 PROCEDURE — 92015 DETERMINE REFRACTIVE STATE: CPT | Performed by: OPHTHALMOLOGY

## 2025-05-29 PROCEDURE — 92014 COMPRE OPH EXAM EST PT 1/>: CPT | Performed by: OPHTHALMOLOGY

## 2025-05-29 ASSESSMENT — REFRACTION_WEARINGRX
OD_ADD: +1.50
SPECS_TYPE: OTC READERS
OS_ADD: +1.50

## 2025-05-29 ASSESSMENT — EXTERNAL EXAM - LEFT EYE: OS_EXAM: NORMAL

## 2025-05-29 ASSESSMENT — REFRACTION_MANIFEST
OS_CYLINDER: -0.75
OD_CYLINDER: -1.00
OD_SPHERE: +0.00
OD_AXIS: 100
OS_ADD: +2.25
OD_ADD: +2.25
OS_AXIS: 080
OS_SPHERE: -0.50

## 2025-05-29 ASSESSMENT — TONOMETRY
OD_IOP_MMHG: 18
IOP_METHOD: GOLDMANN APPLANATION
OS_IOP_MMHG: 16

## 2025-05-29 ASSESSMENT — VISUAL ACUITY
METHOD: SNELLEN - LINEAR
OD_SC: 20/25
OS_SC: 20/25+1

## 2025-05-29 ASSESSMENT — SLIT LAMP EXAM - LIDS
COMMENTS: GOOD POSITION
COMMENTS: GOOD POSITION

## 2025-05-29 ASSESSMENT — CUP TO DISC RATIO
OD_RATIO: .35
OS_RATIO: .35

## 2025-05-29 ASSESSMENT — ENCOUNTER SYMPTOMS: EYES NEGATIVE: 1

## 2025-05-29 ASSESSMENT — EXTERNAL EXAM - RIGHT EYE: OD_EXAM: NORMAL

## 2025-05-29 NOTE — PROGRESS NOTES
Assessment/Plan   Diagnoses and all orders for this visit:  Diabetes mellitus type 2 without retinopathy (Multi)  -no evidence of diabetic retinopathy at the present time  -pt was advised of the importance of good diabetes control and the importance of a yearly dilated diabetic exam    Combined forms of age-related cataract of both eyes  Not visually significant at the present time  continue to monitor    ARMD (age-related macular degeneration), bilateral  continue to monitor    Myopia of left eye  Regular astigmatism of both eyes  Presbyopia  Refractive error  -give Rx for new glasses    Return for a dilated exam in 12   months or sooner if having any problems

## 2025-06-15 DIAGNOSIS — E55.9 VITAMIN D DEFICIENCY: ICD-10-CM

## 2025-06-15 DIAGNOSIS — I10 HYPERTENSION, UNSPECIFIED TYPE: ICD-10-CM

## 2025-06-16 DIAGNOSIS — I10 HYPERTENSION, UNSPECIFIED TYPE: ICD-10-CM

## 2025-06-16 RX ORDER — AMLODIPINE BESYLATE 10 MG/1
10 TABLET ORAL DAILY
Qty: 90 TABLET | Refills: 3 | Status: SHIPPED | OUTPATIENT
Start: 2025-06-16

## 2025-06-16 RX ORDER — ACETAMINOPHEN 500 MG
TABLET ORAL
Qty: 90 CAPSULE | Refills: 3 | Status: ON HOLD | OUTPATIENT
Start: 2025-06-16

## 2025-06-16 RX ORDER — CHLORTHALIDONE 25 MG/1
12.5 TABLET ORAL DAILY
Qty: 45 TABLET | Refills: 3 | OUTPATIENT
Start: 2025-06-16

## 2025-06-16 RX ORDER — CHLORTHALIDONE 50 MG/1
50 TABLET ORAL DAILY
Qty: 90 TABLET | Refills: 3 | Status: SHIPPED | OUTPATIENT
Start: 2025-06-16 | End: 2026-06-16

## 2025-06-22 ENCOUNTER — OFFICE VISIT (OUTPATIENT)
Dept: URGENT CARE | Age: 70
End: 2025-06-22
Payer: MEDICARE

## 2025-06-22 ENCOUNTER — APPOINTMENT (OUTPATIENT)
Dept: RADIOLOGY | Facility: HOSPITAL | Age: 70
DRG: 641 | End: 2025-06-22
Payer: MEDICARE

## 2025-06-22 ENCOUNTER — HOSPITAL ENCOUNTER (INPATIENT)
Facility: HOSPITAL | Age: 70
DRG: 641 | End: 2025-06-22
Attending: STUDENT IN AN ORGANIZED HEALTH CARE EDUCATION/TRAINING PROGRAM | Admitting: HOSPITALIST
Payer: MEDICARE

## 2025-06-22 VITALS
SYSTOLIC BLOOD PRESSURE: 125 MMHG | RESPIRATION RATE: 20 BRPM | DIASTOLIC BLOOD PRESSURE: 67 MMHG | BODY MASS INDEX: 25.77 KG/M2 | HEIGHT: 67 IN | HEART RATE: 69 BPM | OXYGEN SATURATION: 100 % | WEIGHT: 164.2 LBS | TEMPERATURE: 98.1 F

## 2025-06-22 VITALS
RESPIRATION RATE: 12 BRPM | DIASTOLIC BLOOD PRESSURE: 61 MMHG | TEMPERATURE: 101.2 F | HEART RATE: 88 BPM | SYSTOLIC BLOOD PRESSURE: 94 MMHG | OXYGEN SATURATION: 96 %

## 2025-06-22 DIAGNOSIS — R50.9 FEVER, UNSPECIFIED FEVER CAUSE: ICD-10-CM

## 2025-06-22 DIAGNOSIS — E08.65 DIABETES MELLITUS DUE TO UNDERLYING CONDITION WITH HYPERGLYCEMIA, UNSPECIFIED WHETHER LONG TERM INSULIN USE: ICD-10-CM

## 2025-06-22 DIAGNOSIS — R73.9 HYPERGLYCEMIA: ICD-10-CM

## 2025-06-22 DIAGNOSIS — E87.6 HYPOKALEMIA: ICD-10-CM

## 2025-06-22 DIAGNOSIS — R68.89 FLU-LIKE SYMPTOMS: ICD-10-CM

## 2025-06-22 DIAGNOSIS — N17.9 AKI (ACUTE KIDNEY INJURY): ICD-10-CM
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
## 2025-06-22 DIAGNOSIS — R11.0 NAUSEA: Primary | ICD-10-CM

## 2025-06-22 DIAGNOSIS — J06.9 ACUTE URI: ICD-10-CM

## 2025-06-22 LAB
ANION GAP SERPL CALC-SCNC: 15 MMOL/L (ref 10–20)
BASOPHILS # BLD AUTO: 0.03 X10*3/UL (ref 0–0.1)
BASOPHILS NFR BLD AUTO: 0.5 %
BUN SERPL-MCNC: 36 MG/DL (ref 6–23)
CALCIUM SERPL-MCNC: 8.2 MG/DL (ref 8.6–10.3)
CHLORIDE SERPL-SCNC: 96 MMOL/L (ref 98–107)
CO2 SERPL-SCNC: 25 MMOL/L (ref 21–32)
CREAT SERPL-MCNC: 2.61 MG/DL (ref 0.5–1.05)
EGFRCR SERPLBLD CKD-EPI 2021: 19 ML/MIN/1.73M*2
EOSINOPHIL # BLD AUTO: 0.01 X10*3/UL (ref 0–0.7)
EOSINOPHIL NFR BLD AUTO: 0.2 %
ERYTHROCYTE [DISTWIDTH] IN BLOOD BY AUTOMATED COUNT: 12.7 % (ref 11.5–14.5)
GLUCOSE BLD MANUAL STRIP-MCNC: 62 MG/DL (ref 74–99)
GLUCOSE SERPL-MCNC: 188 MG/DL (ref 74–99)
HCT VFR BLD AUTO: 38.8 % (ref 36–46)
HGB BLD-MCNC: 12.5 G/DL (ref 12–16)
IMM GRANULOCYTES # BLD AUTO: 0.02 X10*3/UL (ref 0–0.7)
IMM GRANULOCYTES NFR BLD AUTO: 0.4 % (ref 0–0.9)
LYMPHOCYTES # BLD AUTO: 1.29 X10*3/UL (ref 1.2–4.8)
LYMPHOCYTES NFR BLD AUTO: 22.8 %
MAGNESIUM SERPL-MCNC: 1.66 MG/DL (ref 1.6–2.4)
MCH RBC QN AUTO: 27 PG (ref 26–34)
MCHC RBC AUTO-ENTMCNC: 32.2 G/DL (ref 32–36)
MCV RBC AUTO: 84 FL (ref 80–100)
MONOCYTES # BLD AUTO: 0.6 X10*3/UL (ref 0.1–1)
MONOCYTES NFR BLD AUTO: 10.6 %
NEUTROPHILS # BLD AUTO: 3.72 X10*3/UL (ref 1.2–7.7)
NEUTROPHILS NFR BLD AUTO: 65.5 %
NRBC BLD-RTO: 0 /100 WBCS (ref 0–0)
PLATELET # BLD AUTO: NORMAL 10*3/UL
POC CORONAVIRUS SARS-COV-2 PCR: NEGATIVE
POC FINGERSTICK BLOOD GLUCOSE: 288 MG/DL (ref 70–100)
POC HUMAN RHINOVIRUS PCR: NEGATIVE
POC INFLUENZA A VIRUS PCR: NEGATIVE
POC INFLUENZA B VIRUS PCR: NEGATIVE
POC RESPIRATORY SYNCYTIAL VIRUS PCR: NEGATIVE
POTASSIUM SERPL-SCNC: 3 MMOL/L (ref 3.5–5.3)
POTASSIUM SERPL-SCNC: 3.3 MMOL/L (ref 3.5–5.3)
RBC # BLD AUTO: 4.63 X10*6/UL (ref 4–5.2)
SODIUM SERPL-SCNC: 133 MMOL/L (ref 136–145)
WBC # BLD AUTO: 5.7 X10*3/UL (ref 4.4–11.3)

## 2025-06-22 PROCEDURE — 36415 COLL VENOUS BLD VENIPUNCTURE: CPT | Performed by: STUDENT IN AN ORGANIZED HEALTH CARE EDUCATION/TRAINING PROGRAM

## 2025-06-22 PROCEDURE — 2500000001 HC RX 250 WO HCPCS SELF ADMINISTERED DRUGS (ALT 637 FOR MEDICARE OP): Performed by: HOSPITALIST

## 2025-06-22 PROCEDURE — 2500000004 HC RX 250 GENERAL PHARMACY W/ HCPCS (ALT 636 FOR OP/ED): Performed by: HOSPITALIST

## 2025-06-22 PROCEDURE — 82374 ASSAY BLOOD CARBON DIOXIDE: CPT | Performed by: STUDENT IN AN ORGANIZED HEALTH CARE EDUCATION/TRAINING PROGRAM

## 2025-06-22 PROCEDURE — 2500000005 HC RX 250 GENERAL PHARMACY W/O HCPCS: Performed by: HOSPITALIST

## 2025-06-22 PROCEDURE — 83735 ASSAY OF MAGNESIUM: CPT | Performed by: STUDENT IN AN ORGANIZED HEALTH CARE EDUCATION/TRAINING PROGRAM

## 2025-06-22 PROCEDURE — 99223 1ST HOSP IP/OBS HIGH 75: CPT | Performed by: HOSPITALIST

## 2025-06-22 PROCEDURE — 96366 THER/PROPH/DIAG IV INF ADDON: CPT

## 2025-06-22 PROCEDURE — 87631 RESP VIRUS 3-5 TARGETS: CPT | Performed by: PHYSICIAN ASSISTANT

## 2025-06-22 PROCEDURE — 85025 COMPLETE CBC W/AUTO DIFF WBC: CPT | Performed by: STUDENT IN AN ORGANIZED HEALTH CARE EDUCATION/TRAINING PROGRAM

## 2025-06-22 PROCEDURE — 1036F TOBACCO NON-USER: CPT | Performed by: PHYSICIAN ASSISTANT

## 2025-06-22 PROCEDURE — 99291 CRITICAL CARE FIRST HOUR: CPT | Mod: 25 | Performed by: STUDENT IN AN ORGANIZED HEALTH CARE EDUCATION/TRAINING PROGRAM

## 2025-06-22 PROCEDURE — 2500000001 HC RX 250 WO HCPCS SELF ADMINISTERED DRUGS (ALT 637 FOR MEDICARE OP): Performed by: STUDENT IN AN ORGANIZED HEALTH CARE EDUCATION/TRAINING PROGRAM

## 2025-06-22 PROCEDURE — 96375 TX/PRO/DX INJ NEW DRUG ADDON: CPT

## 2025-06-22 PROCEDURE — 82962 GLUCOSE BLOOD TEST: CPT | Performed by: PHYSICIAN ASSISTANT

## 2025-06-22 PROCEDURE — 99070 SPECIAL SUPPLIES PHYS/QHP: CPT | Performed by: PHYSICIAN ASSISTANT

## 2025-06-22 PROCEDURE — 73502 X-RAY EXAM HIP UNI 2-3 VIEWS: CPT | Mod: LEFT SIDE | Performed by: RADIOLOGY

## 2025-06-22 PROCEDURE — 99215 OFFICE O/P EST HI 40 MIN: CPT | Performed by: PHYSICIAN ASSISTANT

## 2025-06-22 PROCEDURE — 3078F DIAST BP <80 MM HG: CPT | Performed by: PHYSICIAN ASSISTANT

## 2025-06-22 PROCEDURE — 96365 THER/PROPH/DIAG IV INF INIT: CPT

## 2025-06-22 PROCEDURE — 4010F ACE/ARB THERAPY RXD/TAKEN: CPT | Performed by: PHYSICIAN ASSISTANT

## 2025-06-22 PROCEDURE — 84132 ASSAY OF SERUM POTASSIUM: CPT | Performed by: STUDENT IN AN ORGANIZED HEALTH CARE EDUCATION/TRAINING PROGRAM

## 2025-06-22 PROCEDURE — 73502 X-RAY EXAM HIP UNI 2-3 VIEWS: CPT | Mod: LT

## 2025-06-22 PROCEDURE — 1200000002 HC GENERAL ROOM WITH TELEMETRY DAILY

## 2025-06-22 PROCEDURE — 71045 X-RAY EXAM CHEST 1 VIEW: CPT

## 2025-06-22 PROCEDURE — 96361 HYDRATE IV INFUSION ADD-ON: CPT

## 2025-06-22 PROCEDURE — 83036 HEMOGLOBIN GLYCOSYLATED A1C: CPT | Mod: AHULAB | Performed by: HOSPITALIST

## 2025-06-22 PROCEDURE — 3074F SYST BP LT 130 MM HG: CPT | Performed by: PHYSICIAN ASSISTANT

## 2025-06-22 PROCEDURE — 2500000004 HC RX 250 GENERAL PHARMACY W/ HCPCS (ALT 636 FOR OP/ED): Performed by: STUDENT IN AN ORGANIZED HEALTH CARE EDUCATION/TRAINING PROGRAM

## 2025-06-22 PROCEDURE — 82947 ASSAY GLUCOSE BLOOD QUANT: CPT

## 2025-06-22 PROCEDURE — 71045 X-RAY EXAM CHEST 1 VIEW: CPT | Performed by: RADIOLOGY

## 2025-06-22 RX ORDER — AMLODIPINE BESYLATE 10 MG/1
10 TABLET ORAL DAILY
Status: DISCONTINUED | OUTPATIENT
Start: 2025-06-23 | End: 2025-06-24 | Stop reason: HOSPADM

## 2025-06-22 RX ORDER — ROSUVASTATIN CALCIUM 20 MG/1
20 TABLET, COATED ORAL DAILY
Status: DISCONTINUED | OUTPATIENT
Start: 2025-06-23 | End: 2025-06-24 | Stop reason: HOSPADM

## 2025-06-22 RX ORDER — MORPHINE SULFATE 2 MG/ML
1 INJECTION, SOLUTION INTRAMUSCULAR; INTRAVENOUS EVERY 4 HOURS PRN
Status: DISCONTINUED | OUTPATIENT
Start: 2025-06-22 | End: 2025-06-24 | Stop reason: HOSPADM

## 2025-06-22 RX ORDER — ASPIRIN 81 MG/1
81 TABLET ORAL DAILY
Status: DISCONTINUED | OUTPATIENT
Start: 2025-06-23 | End: 2025-06-24 | Stop reason: HOSPADM

## 2025-06-22 RX ORDER — CHLORTHALIDONE 25 MG/1
50 TABLET ORAL DAILY
Status: DISCONTINUED | OUTPATIENT
Start: 2025-06-23 | End: 2025-06-24 | Stop reason: HOSPADM

## 2025-06-22 RX ORDER — DEXTROSE 50 % IN WATER (D50W) INTRAVENOUS SYRINGE
12.5
Status: DISCONTINUED | OUTPATIENT
Start: 2025-06-22 | End: 2025-06-24 | Stop reason: HOSPADM

## 2025-06-22 RX ORDER — MAGNESIUM SULFATE 1 G/100ML
1 INJECTION INTRAVENOUS ONCE
Status: COMPLETED | OUTPATIENT
Start: 2025-06-22 | End: 2025-06-22

## 2025-06-22 RX ORDER — LISINOPRIL 20 MG/1
40 TABLET ORAL DAILY
Status: DISCONTINUED | OUTPATIENT
Start: 2025-06-23 | End: 2025-06-24 | Stop reason: HOSPADM

## 2025-06-22 RX ORDER — CHOLECALCIFEROL (VITAMIN D3) 25 MCG
50 TABLET ORAL DAILY
Status: DISCONTINUED | OUTPATIENT
Start: 2025-06-23 | End: 2025-06-24 | Stop reason: HOSPADM

## 2025-06-22 RX ORDER — OXYCODONE HYDROCHLORIDE 5 MG/1
5 TABLET ORAL EVERY 4 HOURS PRN
Refills: 0 | Status: DISCONTINUED | OUTPATIENT
Start: 2025-06-22 | End: 2025-06-24 | Stop reason: HOSPADM

## 2025-06-22 RX ORDER — ONDANSETRON 4 MG/1
4 TABLET, ORALLY DISINTEGRATING ORAL EVERY 8 HOURS PRN
Status: DISCONTINUED | OUTPATIENT
Start: 2025-06-22 | End: 2025-06-24 | Stop reason: HOSPADM

## 2025-06-22 RX ORDER — ACETAMINOPHEN 325 MG/1
650 TABLET ORAL ONCE
Status: COMPLETED | OUTPATIENT
Start: 2025-06-22 | End: 2025-06-22

## 2025-06-22 RX ORDER — ACETAMINOPHEN 325 MG/1
650 TABLET ORAL EVERY 4 HOURS PRN
Status: DISCONTINUED | OUTPATIENT
Start: 2025-06-22 | End: 2025-06-24 | Stop reason: HOSPADM

## 2025-06-22 RX ORDER — TALC
3 POWDER (GRAM) TOPICAL NIGHTLY PRN
Status: DISCONTINUED | OUTPATIENT
Start: 2025-06-22 | End: 2025-06-24 | Stop reason: HOSPADM

## 2025-06-22 RX ORDER — DEXTROSE 50 % IN WATER (D50W) INTRAVENOUS SYRINGE
25
Status: DISCONTINUED | OUTPATIENT
Start: 2025-06-22 | End: 2025-06-24 | Stop reason: HOSPADM

## 2025-06-22 RX ORDER — INSULIN GLARGINE 100 [IU]/ML
25 INJECTION, SOLUTION SUBCUTANEOUS NIGHTLY
Status: DISCONTINUED | OUTPATIENT
Start: 2025-06-22 | End: 2025-06-24 | Stop reason: HOSPADM

## 2025-06-22 RX ORDER — GUAIFENESIN 100 MG/5ML
200 LIQUID ORAL EVERY 4 HOURS PRN
Status: DISCONTINUED | OUTPATIENT
Start: 2025-06-22 | End: 2025-06-24 | Stop reason: HOSPADM

## 2025-06-22 RX ORDER — ONDANSETRON 4 MG/1
4 TABLET, ORALLY DISINTEGRATING ORAL ONCE
Status: DISCONTINUED | OUTPATIENT
Start: 2025-06-22 | End: 2025-06-22 | Stop reason: HOSPADM

## 2025-06-22 RX ORDER — CLOPIDOGREL BISULFATE 75 MG/1
75 TABLET ORAL DAILY
Status: DISCONTINUED | OUTPATIENT
Start: 2025-06-23 | End: 2025-06-24 | Stop reason: HOSPADM

## 2025-06-22 RX ORDER — POTASSIUM CHLORIDE 14.9 MG/ML
20 INJECTION INTRAVENOUS ONCE
Status: COMPLETED | OUTPATIENT
Start: 2025-06-22 | End: 2025-06-22

## 2025-06-22 RX ORDER — SENNOSIDES 8.6 MG/1
2 TABLET ORAL 2 TIMES DAILY
Status: DISCONTINUED | OUTPATIENT
Start: 2025-06-22 | End: 2025-06-24 | Stop reason: HOSPADM

## 2025-06-22 RX ORDER — ONDANSETRON HYDROCHLORIDE 2 MG/ML
4 INJECTION, SOLUTION INTRAVENOUS EVERY 8 HOURS PRN
Status: DISCONTINUED | OUTPATIENT
Start: 2025-06-22 | End: 2025-06-24 | Stop reason: HOSPADM

## 2025-06-22 RX ORDER — SODIUM CHLORIDE 9 MG/ML
100 INJECTION, SOLUTION INTRAVENOUS CONTINUOUS
Status: ACTIVE | OUTPATIENT
Start: 2025-06-22 | End: 2025-06-23

## 2025-06-22 RX ORDER — METHOCARBAMOL 100 MG/ML
750 INJECTION, SOLUTION INTRAMUSCULAR; INTRAVENOUS ONCE
Status: COMPLETED | OUTPATIENT
Start: 2025-06-22 | End: 2025-06-22

## 2025-06-22 RX ORDER — POTASSIUM CHLORIDE 1.5 G/1.58G
40 POWDER, FOR SOLUTION ORAL ONCE
Status: COMPLETED | OUTPATIENT
Start: 2025-06-22 | End: 2025-06-22

## 2025-06-22 RX ORDER — INSULIN LISPRO 100 [IU]/ML
0-10 INJECTION, SOLUTION INTRAVENOUS; SUBCUTANEOUS
Status: DISCONTINUED | OUTPATIENT
Start: 2025-06-23 | End: 2025-06-24 | Stop reason: HOSPADM

## 2025-06-22 RX ORDER — PANTOPRAZOLE SODIUM 40 MG/1
40 TABLET, DELAYED RELEASE ORAL
Status: DISCONTINUED | OUTPATIENT
Start: 2025-06-23 | End: 2025-06-24 | Stop reason: HOSPADM

## 2025-06-22 RX ADMIN — OXYCODONE HYDROCHLORIDE 5 MG: 5 TABLET ORAL at 22:24

## 2025-06-22 RX ADMIN — ONDANSETRON 4 MG: 4 TABLET, ORALLY DISINTEGRATING ORAL at 14:41

## 2025-06-22 RX ADMIN — MAGNESIUM SULFATE HEPTAHYDRATE 1 G: 1 INJECTION, SOLUTION INTRAVENOUS at 22:24

## 2025-06-22 RX ADMIN — SENNOSIDES 17.2 MG: 8.6 TABLET, FILM COATED ORAL at 22:43

## 2025-06-22 RX ADMIN — Medication 3 MG: at 22:25

## 2025-06-22 RX ADMIN — GUAIFENESIN 200 MG: 200 SOLUTION ORAL at 22:24

## 2025-06-22 RX ADMIN — SODIUM CHLORIDE 100 ML/HR: 0.9 INJECTION, SOLUTION INTRAVENOUS at 22:24

## 2025-06-22 RX ADMIN — POTASSIUM CHLORIDE 20 MEQ: 14.9 INJECTION, SOLUTION INTRAVENOUS at 18:46

## 2025-06-22 RX ADMIN — METHOCARBAMOL 750 MG: 1000 INJECTION, SOLUTION INTRAMUSCULAR; INTRAVENOUS at 16:50

## 2025-06-22 RX ADMIN — ACETAMINOPHEN 650 MG: 325 TABLET ORAL at 14:41

## 2025-06-22 RX ADMIN — SODIUM CHLORIDE, SODIUM LACTATE, POTASSIUM CHLORIDE, AND CALCIUM CHLORIDE 1000 ML: .6; .31; .03; .02 INJECTION, SOLUTION INTRAVENOUS at 17:59

## 2025-06-22 RX ADMIN — POTASSIUM CHLORIDE 40 MEQ: 1.5 POWDER, FOR SOLUTION ORAL at 18:46

## 2025-06-22 SDOH — SOCIAL STABILITY: SOCIAL INSECURITY: DO YOU FEEL UNSAFE GOING BACK TO THE PLACE WHERE YOU ARE LIVING?: NO

## 2025-06-22 SDOH — SOCIAL STABILITY: SOCIAL INSECURITY
WITHIN THE LAST YEAR, HAVE YOU BEEN KICKED, HIT, SLAPPED, OR OTHERWISE PHYSICALLY HURT BY YOUR PARTNER OR EX-PARTNER?: NO

## 2025-06-22 SDOH — ECONOMIC STABILITY: FOOD INSECURITY: WITHIN THE PAST 12 MONTHS, YOU WORRIED THAT YOUR FOOD WOULD RUN OUT BEFORE YOU GOT THE MONEY TO BUY MORE.: NEVER TRUE

## 2025-06-22 SDOH — ECONOMIC STABILITY: INCOME INSECURITY: IN THE PAST 12 MONTHS HAS THE ELECTRIC, GAS, OIL, OR WATER COMPANY THREATENED TO SHUT OFF SERVICES IN YOUR HOME?: NO

## 2025-06-22 SDOH — SOCIAL STABILITY: SOCIAL INSECURITY: WITHIN THE LAST YEAR, HAVE YOU BEEN AFRAID OF YOUR PARTNER OR EX-PARTNER?: NO

## 2025-06-22 SDOH — ECONOMIC STABILITY: HOUSING INSECURITY: AT ANY TIME IN THE PAST 12 MONTHS, WERE YOU HOMELESS OR LIVING IN A SHELTER (INCLUDING NOW)?: NO

## 2025-06-22 SDOH — SOCIAL STABILITY: SOCIAL INSECURITY
WITHIN THE LAST YEAR, HAVE YOU BEEN RAPED OR FORCED TO HAVE ANY KIND OF SEXUAL ACTIVITY BY YOUR PARTNER OR EX-PARTNER?: NO

## 2025-06-22 SDOH — SOCIAL STABILITY: SOCIAL INSECURITY: WITHIN THE LAST YEAR, HAVE YOU BEEN HUMILIATED OR EMOTIONALLY ABUSED IN OTHER WAYS BY YOUR PARTNER OR EX-PARTNER?: NO

## 2025-06-22 SDOH — ECONOMIC STABILITY: FOOD INSECURITY: WITHIN THE PAST 12 MONTHS, THE FOOD YOU BOUGHT JUST DIDN'T LAST AND YOU DIDN'T HAVE MONEY TO GET MORE.: NEVER TRUE

## 2025-06-22 SDOH — ECONOMIC STABILITY: HOUSING INSECURITY: IN THE LAST 12 MONTHS, WAS THERE A TIME WHEN YOU WERE NOT ABLE TO PAY THE MORTGAGE OR RENT ON TIME?: NO

## 2025-06-22 SDOH — ECONOMIC STABILITY: TRANSPORTATION INSECURITY: IN THE PAST 12 MONTHS, HAS LACK OF TRANSPORTATION KEPT YOU FROM MEDICAL APPOINTMENTS OR FROM GETTING MEDICATIONS?: NO

## 2025-06-22 SDOH — SOCIAL STABILITY: SOCIAL INSECURITY: HAVE YOU HAD THOUGHTS OF HARMING ANYONE ELSE?: NO

## 2025-06-22 SDOH — SOCIAL STABILITY: SOCIAL INSECURITY: HAVE YOU HAD ANY THOUGHTS OF HARMING ANYONE ELSE?: NO

## 2025-06-22 SDOH — SOCIAL STABILITY: SOCIAL INSECURITY: ARE THERE ANY APPARENT SIGNS OF INJURIES/BEHAVIORS THAT COULD BE RELATED TO ABUSE/NEGLECT?: NO

## 2025-06-22 SDOH — SOCIAL STABILITY: SOCIAL INSECURITY: DOES ANYONE TRY TO KEEP YOU FROM HAVING/CONTACTING OTHER FRIENDS OR DOING THINGS OUTSIDE YOUR HOME?: NO

## 2025-06-22 SDOH — SOCIAL STABILITY: SOCIAL INSECURITY: ARE YOU OR HAVE YOU BEEN THREATENED OR ABUSED PHYSICALLY, EMOTIONALLY, OR SEXUALLY BY ANYONE?: NO

## 2025-06-22 SDOH — ECONOMIC STABILITY: HOUSING INSECURITY: IN THE PAST 12 MONTHS, HOW MANY TIMES HAVE YOU MOVED WHERE YOU WERE LIVING?: 0

## 2025-06-22 SDOH — SOCIAL STABILITY: SOCIAL INSECURITY: HAS ANYONE EVER THREATENED TO HURT YOUR FAMILY OR YOUR PETS?: NO

## 2025-06-22 SDOH — ECONOMIC STABILITY: FOOD INSECURITY: HOW HARD IS IT FOR YOU TO PAY FOR THE VERY BASICS LIKE FOOD, HOUSING, MEDICAL CARE, AND HEATING?: NOT HARD AT ALL

## 2025-06-22 SDOH — SOCIAL STABILITY: SOCIAL INSECURITY: DO YOU FEEL ANYONE HAS EXPLOITED OR TAKEN ADVANTAGE OF YOU FINANCIALLY OR OF YOUR PERSONAL PROPERTY?: NO

## 2025-06-22 SDOH — SOCIAL STABILITY: SOCIAL INSECURITY: WERE YOU ABLE TO COMPLETE ALL THE BEHAVIORAL HEALTH SCREENINGS?: YES

## 2025-06-22 SDOH — SOCIAL STABILITY: SOCIAL INSECURITY: ABUSE: ADULT

## 2025-06-22 ASSESSMENT — LIFESTYLE VARIABLES
HOW MANY STANDARD DRINKS CONTAINING ALCOHOL DO YOU HAVE ON A TYPICAL DAY: PATIENT DOES NOT DRINK
HOW OFTEN DO YOU HAVE 6 OR MORE DRINKS ON ONE OCCASION: NEVER
HOW OFTEN DO YOU HAVE A DRINK CONTAINING ALCOHOL: NEVER
AUDIT-C TOTAL SCORE: 0
AUDIT-C TOTAL SCORE: 0
SKIP TO QUESTIONS 9-10: 1

## 2025-06-22 ASSESSMENT — ACTIVITIES OF DAILY LIVING (ADL)
HEARING - RIGHT EAR: FUNCTIONAL
LACK_OF_TRANSPORTATION: NO
TOILETING: INDEPENDENT
ADEQUATE_TO_COMPLETE_ADL: YES
FEEDING YOURSELF: INDEPENDENT
HEARING - LEFT EAR: FUNCTIONAL
BATHING: INDEPENDENT
PATIENT'S MEMORY ADEQUATE TO SAFELY COMPLETE DAILY ACTIVITIES?: YES
GROOMING: INDEPENDENT
JUDGMENT_ADEQUATE_SAFELY_COMPLETE_DAILY_ACTIVITIES: YES
DRESSING YOURSELF: INDEPENDENT
WALKS IN HOME: INDEPENDENT

## 2025-06-22 ASSESSMENT — COGNITIVE AND FUNCTIONAL STATUS - GENERAL
STANDING UP FROM CHAIR USING ARMS: A LITTLE
DAILY ACTIVITIY SCORE: 22
MOVING TO AND FROM BED TO CHAIR: A LITTLE
STANDING UP FROM CHAIR USING ARMS: A LITTLE
MOBILITY SCORE: 20
WALKING IN HOSPITAL ROOM: A LITTLE
CLIMB 3 TO 5 STEPS WITH RAILING: A LITTLE
CLIMB 3 TO 5 STEPS WITH RAILING: A LITTLE
TOILETING: A LITTLE
MOVING TO AND FROM BED TO CHAIR: A LITTLE
DRESSING REGULAR LOWER BODY CLOTHING: A LITTLE
PATIENT BASELINE BEDBOUND: NO
DRESSING REGULAR LOWER BODY CLOTHING: A LITTLE
WALKING IN HOSPITAL ROOM: A LITTLE
DAILY ACTIVITIY SCORE: 22
TOILETING: A LITTLE
MOBILITY SCORE: 20

## 2025-06-22 ASSESSMENT — PAIN SCALES - GENERAL
PAINLEVEL_OUTOF10: 8
PAINLEVEL_OUTOF10: 6
PAINLEVEL_OUTOF10: 6
PAINLEVEL_OUTOF10: 0 - NO PAIN
PAINLEVEL_OUTOF10: 8

## 2025-06-22 ASSESSMENT — PAIN DESCRIPTION - DESCRIPTORS
DESCRIPTORS: CRAMPING
DESCRIPTORS: CRAMPING

## 2025-06-22 ASSESSMENT — PAIN DESCRIPTION - LOCATION: LOCATION: HIP

## 2025-06-22 ASSESSMENT — ENCOUNTER SYMPTOMS
GASTROINTESTINAL NEGATIVE: 1
APPETITE CHANGE: 1
ALLERGIC/IMMUNOLOGIC NEGATIVE: 1
EYES NEGATIVE: 1
COUGH: 1
ROS GI COMMENTS: NO GI SYMPTOMS
PSYCHIATRIC NEGATIVE: 1
HEMATOLOGIC/LYMPHATIC NEGATIVE: 1
CARDIOVASCULAR NEGATIVE: 1
MUSCULOSKELETAL NEGATIVE: 1
COUGH: 1
NEUROLOGICAL NEGATIVE: 1

## 2025-06-22 ASSESSMENT — PAIN - FUNCTIONAL ASSESSMENT
PAIN_FUNCTIONAL_ASSESSMENT: 0-10

## 2025-06-22 ASSESSMENT — PATIENT HEALTH QUESTIONNAIRE - PHQ9
2. FEELING DOWN, DEPRESSED OR HOPELESS: NOT AT ALL
SUM OF ALL RESPONSES TO PHQ9 QUESTIONS 1 & 2: 0
1. LITTLE INTEREST OR PLEASURE IN DOING THINGS: NOT AT ALL

## 2025-06-22 ASSESSMENT — PAIN DESCRIPTION - PAIN TYPE: TYPE: ACUTE PAIN

## 2025-06-22 NOTE — ED PROCEDURE NOTE
Procedure  Critical Care    Performed by: Abelino Elizondo MD  Authorized by: Abelino Elizondo MD    Critical care provider statement:     Critical care time (minutes):  35    Critical care time was exclusive of:  Separately billable procedures and treating other patients and teaching time    Critical care was necessary to treat or prevent imminent or life-threatening deterioration of the following conditions: hypokalemia.    Critical care was time spent personally by me on the following activities:  Examination of patient, review of old charts, re-evaluation of patient's condition, ordering and review of radiographic studies and ordering and review of laboratory studies               Abelino Elizondo MD  06/22/25 1931

## 2025-06-22 NOTE — ED PROVIDER NOTES
"[facility] ED Note    Date of Service: 6/22/2025  Reason for Visit: Cough and Fall      Patient History     HPI  Tammy Becerra is a 70 y.o. female with past medical history of GERD, HTN, HLD, diabetes, and peripheral vascular disease (s/p right leg revascularization in 2020) presents the emergency department after a mechanical fall.  Patient states that she has had a nonproductive cough x 3 days.  She denies any chest pain, shortness of breath, fever, or other symptoms related to her cough.  She states she was standing in the bathroom when her left leg suddenly gave out, and she fell on her left hip.  Patient denies passing out, denies losing consciousness, did not hit her head.  She is not on any anticoagulation.  Patient states she does have pain to her left hip and does have some spasming of her bilateral feet that happened after her fall.  She denies other symptoms at this time.  He specifically denies any preceding symptoms including chest pain, shortness of breath, or nausea and tunnel vision.      Medical History[1]  Surgical History[2]      Physical Exam     Vitals:    06/22/25 1525   BP: 117/72   BP Location: Left arm   Patient Position: Sitting   Pulse: 91   Resp: 20   Temp: 36.3 °C (97.3 °F)   TempSrc: Temporal   SpO2: 95%   Weight: 83.9 kg (185 lb)   Height: 1.702 m (5' 7\")     General: Well-appearing, age-appropriate female, nontoxic, sitting comfortably in the gurney no acute distress.  Pulmonary:   Non-labored breathing. Breath sounds clear bilaterally  Cardiac:  Regular rate   Abdomen:  Soft. Non-tender. Non-distended  Musculoskeletal: No long bone deformity.  Tenderness to palpation of the left hip.  No tenderness to logroll.  Sensation intact light touch.  Bilateral DP dopplerable signals appreciated.  Skin:  Dry, no rashes  Neuro: Alert and orient x 4.  Moves all 4 extremity spontaneously.    Diagnostic Studies     Labs:  Please see EMR for labs obtained during this patient " encounter.    Radiology:  Please see EMR for imaging obtained during this patient encounter.    ED Course and MDM     Tammy Becerra is a 70 y.o. female with a history and presentation as described above in HPI.      Upon presentation, the patient was afebrile, well-appearing, with unremarkable vital signs.  Patient presented to the emergency department after a mechanical fall in her left hip.  Differential diagnosis includes electrolyte abnormality, infection, conceivable cardiac cause or possible dehydration.  Given patient's report that she did not pass out, she did not lose consciousness, she did have any preceding chest pain or shortness of breath, her left leg just gave out, I have low suspicion for any cardiac cause to her symptoms and no further workup is pursued.  Given patient was having some tingling and cramping in her bilateral feet, there was concern for possible vascular etiology versus possible electrolyte abnormality.  Lower suspicion for vascular etiology given the cramping sensation in her feet, unrelieved in any position with bilateral feet appearing warm, well-perfused, with good capillary refill and good dopplerable DPs.  Patient was notable have an MONO with creatinine of 2.61, up from a baseline of 1.27 as well as a potassium of 3.  Patient was given potassium as well as fluids in the emergency department for both her MONO as well as her hypokalemia.  Patient was given Robaxin initially for her cramping.  Patient's x-ray did not show any fracture of her left hip.  Given her MONO with hyperkalemia, she will require admission for further treatment and workup.  I did discuss with the hospitalist who is in agreement with plan for admission.      Impression     Diagnoses as of 06/22/25 1932   Fall, initial encounter   Hypokalemia   MONO (acute kidney injury)        Plan       Admit to medicine, as inpatient status for further evaluation and management of mono and hypo K      Abelino Elizondo,  MD  [facility] Emergency Medicine           [1]   Past Medical History:  Diagnosis Date    Allergy status to unspecified drugs, medicaments and biological substances     History of seasonal allergies    Aphakia, right eye 09/07/2018    Aphakia of right eye    Cortical age-related cataract, right eye 09/07/2018    Cortical age-related cataract of right eye    Hyperlipidemia, unspecified 12/22/2022    Hyperlipidemia    Hypokalemia 03/19/2019    Low serum potassium    Mixed conductive and sensorineural hearing loss, unilateral, left ear, 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     Mixed hearing loss of left ear    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    Other conditions influencing health status 12/17/2020    Diabetes type 2, uncontrolled    Other conditions influencing health status     Uncontrolled type 2 diabetes mellitus    Overweight     Patient overweight    Patient's noncompliance with other medical treatment and regimen due to unspecified reason     Non compliance with medical treatment    Personal history of other diseases of the circulatory system     History of hypertension    Personal history of other endocrine, nutritional and metabolic disease     History of type 2 diabetes mellitus    Personal history of other endocrine, nutritional and metabolic disease 03/12/2019    History of type 2 diabetes mellitus    Personal history of other specified conditions 06/08/2021    History of nausea and vomiting    Unspecified sensorineural hearing loss     Sensorineural hearing loss of right ear   [2]   Past Surgical History:  Procedure Laterality Date    IR INTERVENTION REVASCULARIZATION Bilateral 12/23/2019    IR INTERVENTION REVASCULARIZATION 12/23/2019 [facility]    MR HEAD ANGIO WO IV CONTRAST  10/4/2019    MR HEAD ANGIO WO IV CONTRAST 10/4/2019 [facility] CLINICAL LEGAC[facility]    MR NECK ANGIO WO IV CONTRAST  10/4/2019    MR NECK ANGIO WO IV CONTRAST  10/4/2019 [facility] CLINICAL LEGAC[facility]    TUBAL LIGATION  06/28/2018    Tubal Ligation        Abelino Elizondo MD  06/22/25 1932

## 2025-06-22 NOTE — PROGRESS NOTES
Subjective   Patient ID: Tammy Becerra is a 70 y.o. female. They present today with a chief complaint of Fall, Cough, Nausea, and Sinusitis (Pt states she has not been feeling and when she went  to use the restroom she fell and is having pain on her left side).    History of Present Illness    Trauma  Mechanism of injury: Fall   Cough    Sinusitis  Associated symptoms: cough      This is a 70-year-old female presents to urgent care for fever, nausea, fall.  Patient states she been sick for 3 days.  She has had fever, nasal congestion, cough, weakness, and fatigue.  She fell in the bathroom this morning.  Denies hitting her head.  She is on aspirin.  Complaining of left buttock/left hip pain.  She is able to ambulate with a limp.  Her blood sugars have been low the last few days.  She checked it earlier and it was over 300.  Denies chest pain, headache, shortness of breath, or abdominal pain.  Past Medical History  Allergies as of 06/22/2025    (No Known Allergies)       Prescriptions Prior to Admission[1]     Medical History[2]    Surgical History[3]     reports that she has never smoked. She has never used smokeless tobacco. She reports current drug use. Drug: Marijuana. She reports that she does not drink alcohol.    Review of Systems  Review of Systems   Respiratory:  Positive for cough.    All other systems reviewed and are negative.                                 Objective    Vitals:    06/22/25 1425   BP: 97/59   Pulse: 88   Resp: 12   Temp: (!) 38.4 °C (101.2 °F)   SpO2: 96%     No LMP recorded. Patient is postmenopausal.    Physical Exam  Physical Exam:    General: Vitals noted.  Febrile. Normal phonation, no stridor, no trismus    ENT: Left TM is unremarkable. Right TM is unremarkable. Left external auditory canal is unremarkable. Right external auditory canal is unremarkable. Mastoids nontender. Normal conjunctival. Eyes are PERRLA. Posterior oropharynx without exudate or swelling.    Neck: Supple. No  meningismus through full range of motion. No anterior cervical lymphadenopathy. No posterior cervical chain lymphadenopathy    Cardiac: Regular rate rhythm    Lungs: Good aeration throughout. No adventitious breath sounds.    Abdomen: Soft, nontender, nonsurgical throughout.     Extremities: No peripheral edema.  Tender over left lateral hip.  Able to ambulate.    Skin: No rash    Neuro: No gross neurological deficits      Procedures    Point of Care Test & Imaging Results from this visit  Results for orders placed or performed in visit on 06/22/25   POCT fingerstick glucose manually resulted   Result Value Ref Range    POC Fingerstick Blood Glucose 288 (A) 70 - 100 mg/dl      Imaging  No results found.    Cardiology, Vascular, and Other Imaging  No other imaging results found for the past 2 days      Diagnostic study results (if any) were reviewed by Jonny Correa PA-C.    Assessment/Plan   Allergies, medications, history, and pertinent labs/EKGs/Imaging reviewed by Jonny Correa PA-C.     Medical Decision Making  MDM:      Summary: This is a 70-year-old female presents urgent care for multiple complaints. Vital signs were reviewed.  Patient is febrile.  Initial blood pressure 97/59.  Did receive 650 mg of Tylenol and 4 mg Zofran ODT.  Differential diagnosis includes but not limited to influenza, COVID-19, bacterial infection, sepsis, fracture.  COVID swab prior test is negative.  Fingerstick to 288.  Repeat blood pressure systolic of 94 per .  She felt dizzy this morning in the bathroom and fell.  Likely from hypotension.  I recommend that she go to the emergency department for further evaluation.  I am recommending EMS transport.  Patient declines.  Family member will transport her by private vehicle [facility]. Discussed with Dr. Barr.     Orders and Diagnoses  Diagnoses and all orders for this visit:  Nausea  -     POCT fingerstick glucose manually resulted  -     ondansetron ODT (Zofran-ODT)  disintegrating tablet 4 mg  Flu-like symptoms  -     POCT SPOTFIRE R/ST Panel Mini w/COVID ([facility]) manually resulted  Diabetes mellitus due to underlying condition with hyperglycemia, unspecified whether long term insulin use  -     POCT fingerstick glucose manually resulted  Fever, unspecified fever cause  -     acetaminophen (Tylenol) tablet 650 mg  Hyperglycemia      Medical Admin Record      Patient disposition: ED    Electronically signed by Jonny Correa PA-C  2:39 PM           [1] (Not in a hospital admission)  [2]   Past Medical History:  Diagnosis Date    Allergy status to unspecified drugs, medicaments and biological substances     History of seasonal allergies    Aphakia, right eye 09/07/2018    Aphakia of right eye    Cortical age-related cataract, right eye 09/07/2018    Cortical age-related cataract of right eye    Hyperlipidemia, unspecified 12/22/2022    Hyperlipidemia    Hypokalemia 03/19/2019    Low serum potassium    Mixed conductive and sensorineural hearing loss, unilateral, left ear, 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     Mixed hearing loss of left ear    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    Other conditions influencing health status 12/17/2020    Diabetes type 2, uncontrolled    Other conditions influencing health status     Uncontrolled type 2 diabetes mellitus    Overweight     Patient overweight    Patient's noncompliance with other medical treatment and regimen due to unspecified reason     Non compliance with medical treatment    Personal history of other diseases of the circulatory system     History of hypertension    Personal history of other endocrine, nutritional and metabolic disease     History of type 2 diabetes mellitus    Personal history of other endocrine, nutritional and metabolic disease 03/12/2019    History of type 2 diabetes mellitus    Personal history of other specified conditions 06/08/2021    History  of nausea and vomiting    Unspecified sensorineural hearing loss     Sensorineural hearing loss of right ear   [3]   Past Surgical History:  Procedure Laterality Date    IR INTERVENTION REVASCULARIZATION Bilateral 12/23/2019    IR INTERVENTION REVASCULARIZATION 12/23/2019 [facility]    MR HEAD ANGIO WO IV CONTRAST  10/4/2019    MR HEAD ANGIO WO IV CONTRAST 10/4/2019 [facility] CLINICAL LEGACY    MR NECK ANGIO WO IV CONTRAST  10/4/2019    MR NECK ANGIO WO IV CONTRAST 10/4/2019 [facility] CLINICAL LEGACY    TUBAL LIGATION  06/28/2018    Tubal Ligation

## 2025-06-22 NOTE — ED TRIAGE NOTES
Pt c/o cough and nasal congestion for the past 3 days. Pt states last night she tripped and fell injuring her left leg and hip. Denies LOC. Denies blood thinners.

## 2025-06-23 LAB
ANION GAP SERPL CALC-SCNC: 13 MMOL/L (ref 10–20)
APPEARANCE UR: ABNORMAL
BASOPHILS # BLD AUTO: 0.03 X10*3/UL (ref 0–0.1)
BASOPHILS NFR BLD AUTO: 0.7 %
BILIRUB UR STRIP.AUTO-MCNC: NEGATIVE MG/DL
BUN SERPL-MCNC: 35 MG/DL (ref 6–23)
CALCIUM SERPL-MCNC: 7.7 MG/DL (ref 8.6–10.3)
CHLORIDE SERPL-SCNC: 102 MMOL/L (ref 98–107)
CO2 SERPL-SCNC: 24 MMOL/L (ref 21–32)
COLOR UR: ABNORMAL
CREAT SERPL-MCNC: 1.86 MG/DL (ref 0.5–1.05)
EGFRCR SERPLBLD CKD-EPI 2021: 29 ML/MIN/1.73M*2
EOSINOPHIL # BLD AUTO: 0.1 X10*3/UL (ref 0–0.7)
EOSINOPHIL NFR BLD AUTO: 2.3 %
ERYTHROCYTE [DISTWIDTH] IN BLOOD BY AUTOMATED COUNT: 12.3 % (ref 11.5–14.5)
EST. AVERAGE GLUCOSE BLD GHB EST-MCNC: 180 MG/DL
GLUCOSE BLD MANUAL STRIP-MCNC: 105 MG/DL (ref 74–99)
GLUCOSE BLD MANUAL STRIP-MCNC: 123 MG/DL (ref 74–99)
GLUCOSE BLD MANUAL STRIP-MCNC: 132 MG/DL (ref 74–99)
GLUCOSE BLD MANUAL STRIP-MCNC: 145 MG/DL (ref 74–99)
GLUCOSE BLD MANUAL STRIP-MCNC: 152 MG/DL (ref 74–99)
GLUCOSE SERPL-MCNC: 95 MG/DL (ref 74–99)
GLUCOSE UR STRIP.AUTO-MCNC: NORMAL MG/DL
HBA1C MFR BLD: 7.9 % (ref ?–5.7)
HCT VFR BLD AUTO: 32.6 % (ref 36–46)
HGB BLD-MCNC: 11.1 G/DL (ref 12–16)
HYALINE CASTS #/AREA URNS AUTO: ABNORMAL /LPF
IMM GRANULOCYTES # BLD AUTO: 0.01 X10*3/UL (ref 0–0.7)
IMM GRANULOCYTES NFR BLD AUTO: 0.2 % (ref 0–0.9)
KETONES UR STRIP.AUTO-MCNC: NEGATIVE MG/DL
LEUKOCYTE ESTERASE UR QL STRIP.AUTO: NEGATIVE
LYMPHOCYTES # BLD AUTO: 1.71 X10*3/UL (ref 1.2–4.8)
LYMPHOCYTES NFR BLD AUTO: 38.6 %
MAGNESIUM SERPL-MCNC: 2.07 MG/DL (ref 1.6–2.4)
MCH RBC QN AUTO: 27.2 PG (ref 26–34)
MCHC RBC AUTO-ENTMCNC: 34 G/DL (ref 32–36)
MCV RBC AUTO: 80 FL (ref 80–100)
MONOCYTES # BLD AUTO: 0.6 X10*3/UL (ref 0.1–1)
MONOCYTES NFR BLD AUTO: 13.5 %
MUCOUS THREADS #/AREA URNS AUTO: ABNORMAL /LPF
NEUTROPHILS # BLD AUTO: 1.98 X10*3/UL (ref 1.2–7.7)
NEUTROPHILS NFR BLD AUTO: 44.7 %
NITRITE UR QL STRIP.AUTO: NEGATIVE
NRBC BLD-RTO: 0 /100 WBCS (ref 0–0)
PH UR STRIP.AUTO: 5.5 [PH]
PLATELET # BLD AUTO: 56 X10*3/UL (ref 150–450)
POTASSIUM SERPL-SCNC: 3.4 MMOL/L (ref 3.5–5.3)
PROT UR STRIP.AUTO-MCNC: ABNORMAL MG/DL
RBC # BLD AUTO: 4.08 X10*6/UL (ref 4–5.2)
RBC # UR STRIP.AUTO: NEGATIVE MG/DL
RBC #/AREA URNS AUTO: ABNORMAL /HPF
SODIUM SERPL-SCNC: 136 MMOL/L (ref 136–145)
SP GR UR STRIP.AUTO: 1.01
SQUAMOUS #/AREA URNS AUTO: ABNORMAL /HPF
UROBILINOGEN UR STRIP.AUTO-MCNC: NORMAL MG/DL
WBC # BLD AUTO: 4.4 X10*3/UL (ref 4.4–11.3)
WBC #/AREA URNS AUTO: ABNORMAL /HPF

## 2025-06-23 PROCEDURE — 2500000001 HC RX 250 WO HCPCS SELF ADMINISTERED DRUGS (ALT 637 FOR MEDICARE OP): Performed by: HOSPITALIST

## 2025-06-23 PROCEDURE — 1200000002 HC GENERAL ROOM WITH TELEMETRY DAILY

## 2025-06-23 PROCEDURE — 81001 URINALYSIS AUTO W/SCOPE: CPT | Performed by: HOSPITALIST

## 2025-06-23 PROCEDURE — 2500000002 HC RX 250 W HCPCS SELF ADMINISTERED DRUGS (ALT 637 FOR MEDICARE OP, ALT 636 FOR OP/ED): Performed by: HOSPITALIST

## 2025-06-23 PROCEDURE — 2500000004 HC RX 250 GENERAL PHARMACY W/ HCPCS (ALT 636 FOR OP/ED): Performed by: HOSPITALIST

## 2025-06-23 PROCEDURE — 36415 COLL VENOUS BLD VENIPUNCTURE: CPT | Performed by: HOSPITALIST

## 2025-06-23 PROCEDURE — 85025 COMPLETE CBC W/AUTO DIFF WBC: CPT | Performed by: HOSPITALIST

## 2025-06-23 PROCEDURE — 83735 ASSAY OF MAGNESIUM: CPT | Performed by: HOSPITALIST

## 2025-06-23 PROCEDURE — 99232 SBSQ HOSP IP/OBS MODERATE 35: CPT | Performed by: STUDENT IN AN ORGANIZED HEALTH CARE EDUCATION/TRAINING PROGRAM

## 2025-06-23 PROCEDURE — 97161 PT EVAL LOW COMPLEX 20 MIN: CPT | Mod: GP | Performed by: PHYSICAL THERAPIST

## 2025-06-23 PROCEDURE — 82947 ASSAY GLUCOSE BLOOD QUANT: CPT

## 2025-06-23 PROCEDURE — 2500000001 HC RX 250 WO HCPCS SELF ADMINISTERED DRUGS (ALT 637 FOR MEDICARE OP): Performed by: STUDENT IN AN ORGANIZED HEALTH CARE EDUCATION/TRAINING PROGRAM

## 2025-06-23 PROCEDURE — 2500000004 HC RX 250 GENERAL PHARMACY W/ HCPCS (ALT 636 FOR OP/ED): Performed by: STUDENT IN AN ORGANIZED HEALTH CARE EDUCATION/TRAINING PROGRAM

## 2025-06-23 PROCEDURE — 80048 BASIC METABOLIC PNL TOTAL CA: CPT | Performed by: HOSPITALIST

## 2025-06-23 RX ORDER — ACETAMINOPHEN 325 MG/1
650 TABLET ORAL ONCE
Status: SHIPPED | OUTPATIENT
Start: 2025-06-23

## 2025-06-23 RX ORDER — POTASSIUM CHLORIDE 1.5 G/1.58G
40 POWDER, FOR SOLUTION ORAL ONCE
Status: COMPLETED | OUTPATIENT
Start: 2025-06-23 | End: 2025-06-23

## 2025-06-23 RX ADMIN — INSULIN GLARGINE 25 UNITS: 100 INJECTION, SOLUTION SUBCUTANEOUS at 20:36

## 2025-06-23 RX ADMIN — SENNOSIDES 17.2 MG: 8.6 TABLET, FILM COATED ORAL at 09:24

## 2025-06-23 RX ADMIN — AMLODIPINE BESYLATE 10 MG: 10 TABLET ORAL at 09:24

## 2025-06-23 RX ADMIN — Medication 50 MCG: at 06:18

## 2025-06-23 RX ADMIN — ACETAMINOPHEN 650 MG: 325 TABLET ORAL at 10:58

## 2025-06-23 RX ADMIN — POTASSIUM CHLORIDE 40 MEQ: 1.5 POWDER, FOR SOLUTION ORAL at 09:53

## 2025-06-23 RX ADMIN — GUAIFENESIN 200 MG: 200 SOLUTION ORAL at 04:49

## 2025-06-23 RX ADMIN — PANTOPRAZOLE SODIUM 40 MG: 40 TABLET, DELAYED RELEASE ORAL at 06:18

## 2025-06-23 RX ADMIN — MORPHINE SULFATE 1 MG: 2 INJECTION, SOLUTION INTRAMUSCULAR; INTRAVENOUS at 04:49

## 2025-06-23 RX ADMIN — CLOPIDOGREL 75 MG: 75 TABLET ORAL at 09:24

## 2025-06-23 RX ADMIN — ASPIRIN 81 MG: 81 TABLET, COATED ORAL at 09:24

## 2025-06-23 RX ADMIN — ROSUVASTATIN 20 MG: 20 TABLET, FILM COATED ORAL at 09:24

## 2025-06-23 RX ADMIN — SODIUM CHLORIDE, SODIUM LACTATE, POTASSIUM CHLORIDE, AND CALCIUM CHLORIDE 1000 ML: .6; .31; .03; .02 INJECTION, SOLUTION INTRAVENOUS at 15:07

## 2025-06-23 ASSESSMENT — COGNITIVE AND FUNCTIONAL STATUS - GENERAL
WALKING IN HOSPITAL ROOM: A LITTLE
STANDING UP FROM CHAIR USING ARMS: A LITTLE
MOBILITY SCORE: 22
WALKING IN HOSPITAL ROOM: A LITTLE
WALKING IN HOSPITAL ROOM: A LITTLE
MOVING TO AND FROM BED TO CHAIR: A LITTLE
TOILETING: A LITTLE
DRESSING REGULAR UPPER BODY CLOTHING: A LITTLE
CLIMB 3 TO 5 STEPS WITH RAILING: A LITTLE
HELP NEEDED FOR BATHING: A LITTLE
DAILY ACTIVITIY SCORE: 22
MOVING FROM LYING ON BACK TO SITTING ON SIDE OF FLAT BED WITH BEDRAILS: A LITTLE
TURNING FROM BACK TO SIDE WHILE IN FLAT BAD: A LITTLE
MOVING TO AND FROM BED TO CHAIR: A LITTLE
PERSONAL GROOMING: A LITTLE
HELP NEEDED FOR BATHING: A LITTLE
STANDING UP FROM CHAIR USING ARMS: A LITTLE
DRESSING REGULAR LOWER BODY CLOTHING: A LITTLE
DRESSING REGULAR LOWER BODY CLOTHING: A LITTLE
CLIMB 3 TO 5 STEPS WITH RAILING: A LITTLE
CLIMB 3 TO 5 STEPS WITH RAILING: A LITTLE
MOBILITY SCORE: 20

## 2025-06-23 ASSESSMENT — PAIN - FUNCTIONAL ASSESSMENT
PAIN_FUNCTIONAL_ASSESSMENT: 0-10

## 2025-06-23 ASSESSMENT — ACTIVITIES OF DAILY LIVING (ADL)
ADL_ASSISTANCE: INDEPENDENT
LACK_OF_TRANSPORTATION: NO

## 2025-06-23 ASSESSMENT — PAIN DESCRIPTION - LOCATION
LOCATION: LEG
LOCATION: HEAD

## 2025-06-23 ASSESSMENT — PAIN SCALES - GENERAL
PAINLEVEL_OUTOF10: 0 - NO PAIN
PAINLEVEL_OUTOF10: 0 - NO PAIN
PAINLEVEL_OUTOF10: 3
PAINLEVEL_OUTOF10: 8
PAINLEVEL_OUTOF10: 0 - NO PAIN
PAINLEVEL_OUTOF10: 0 - NO PAIN

## 2025-06-23 ASSESSMENT — PAIN DESCRIPTION - ORIENTATION: ORIENTATION: RIGHT;LEFT

## 2025-06-23 ASSESSMENT — PAIN DESCRIPTION - DESCRIPTORS: DESCRIPTORS: ACHING

## 2025-06-23 NOTE — CARE PLAN
The patient's goals for the shift include pain control.    The clinical goals for the shift include replace electrolytes, maintain safety, and pain management.    Over the shift, the patient did not make progress toward the following goals. Barriers to progression include n/a. Recommendations to address these barriers include n/a.

## 2025-06-23 NOTE — PROGRESS NOTES
06/23/25 1140   Discharge Planning   Living Arrangements Spouse/significant other   Support Systems Spouse/significant other   Type of Residence Private residence   Expected Discharge Disposition Home   Does the patient need discharge transport arranged? No   Financial Resource Strain   How hard is it for you to pay for the very basics like food, housing, medical care, and heating? Not hard   Housing Stability   In the last 12 months, was there a time when you were not able to pay the mortgage or rent on time? N   At any time in the past 12 months, were you homeless or living in a shelter (including now)? N   Transportation Needs   In the past 12 months, has lack of transportation kept you from medical appointments or from getting medications? no   In the past 12 months, has lack of transportation kept you from meetings, work, or from getting things needed for daily living? No   Intensity of Service   Intensity of Service 0-30 min     TCC met with pt at bedside. Explained my role as discharge planner. Lives with spouse. Walks without cane or walker. Declines need for HHC at this time. Pt has no home going needs.                    Risks/benefits discussed with patient or patient surrogate

## 2025-06-23 NOTE — PROGRESS NOTES
Physical Therapy    Physical Therapy Evaluation    Patient Name: Tammy Becerra  MRN: [id number]  Department: [facility] [address]  Room: [address] [place] [address]  Today's Date: 6/23/2025   Time Calculation  Start Time: 1609  Stop Time: 1619  Time Calculation (min): 10 min    Assessment/Plan   PT Assessment  PT Assessment Results: Decreased mobility  Rehab Prognosis: Excellent  Barriers to Discharge Home: No anticipated barriers  Evaluation/Treatment Tolerance: Patient tolerated treatment well  Medical Staff Made Aware: Yes  Strengths: Ability to acquire knowledge, Housing layout, Support and attitude of living partners, Physical health, Premorbid level of function  End of Session Communication: PCT/NA/CTA  Assessment Comment: 70 year-old F presents with  inability to negotiate a curb, decreased ambulation and transfers, and unsteadiness; can benefit from skilled PT intervention to assist with discharge planning and address the aforementioned issues to enable her to return to her prior level of function, which was IND community ambulation without a device.   End of Session Patient Position: Bed, 3 rail up, Alarm off, not on at start of session (family present)  IP OR SWING BED PT PLAN  Inpatient or Swing Bed: Inpatient  PT Plan  Treatment/Interventions: Transfer training, Gait training, Neuromuscular re-education, Strengthening, Endurance training, Therapeutic exercise, Therapeutic activity  PT Plan: Ongoing PT  PT Frequency: 2 times per week  PT Discharge Recommendations: No PT needed after discharge  PT Recommended Transfer Status: Stand by assist  PT - OK to Discharge: Yes (per PT POC)    Subjective     PT Visit Info:  PT Received On: 06/23/25  General Visit Information:  General  Reason for Referral: 71 y/o F with three day h/o cough to ED following a fall (tripped on something) at home resulting in L hip pain; also found to have MONO with low potassium and low magnesium.  Referred By: Re Wolf MD  Past Medical History Relevant to  Rehab: GERD, HTN, HLD, DM, PVD (s/p R LE revascularization in 2020), [place], h/o noncompliance  Family/Caregiver Present: Yes  Caregiver Feedback: spouse, daughter, grandkids  Prior to Session Communication: Bedside nurse  Patient Position Received: Bed, 3 rail up, Alarm off, not on at start of session  Preferred Learning Style: auditory, verbal  General Comment: Pt irritated with her IV, but is otherwise agreable to PT eval.  Home Living:  Home Living  Type of Home: House  Lives With: Spouse  Home Adaptive Equipment: Cane  Home Layout: One level  Home Access: No concerns (pt reports no entry stairs)  Bathroom Shower/Tub: Tub/shower unit  Bathroom Toilet: Standard  Bathroom Equipment: None  Prior Level of Function:  Prior Function Per Pt/Caregiver Report  Level of [place]: Independent with ADLs and functional transfers, Independent with homemaking with ambulation  Receives Help From: Family  ADL Assistance: Independent  Homemaking Assistance: Independent  Ambulatory Assistance: Independent (community distances, no device)  Hand Dominance: Right  Prior Function Comments: Pt denies any other recent falls besides the one preceding this admission; +drives  Precautions:  Precautions  Medical Precautions: Fall precautions      Date/Time Vitals Session Patient Position Pulse Resp SpO2 BP MAP (mmHg)    06/23/25 1630 --  --  71  --  99 %  143/64  80                 Objective   Pain:  Pain Assessment  Pain Assessment: 0-10  0-10 (Numeric) Pain Score: 0 - No pain  Cognition:  Cognition  Overall Cognitive Status: Within Functional Limits  Attention: Within Functional Limits  Memory: Within Funtional Limits  Processing Speed: Within funtional limits    General Assessments:  General Observation  General Observation: R UE IV noted to be leaking at end of session; RN at lunch, notified CTA who called RN.     Activity Tolerance  Endurance: Endurance does not limit participation in activity    Coordination  Movements are Fluid and  Coordinated: Yes  Coordination Comment: per gross observation    Postural Control  Postural Control: Within Functional Limits    Static Sitting Balance  Static Sitting-Balance Support: No upper extremity supported, Feet supported  Static Sitting-Level of Assistance: Independent  Static Sitting-Comment/Number of Minutes: on EOB  Dynamic Sitting Balance  Dynamic Sitting-Balance Support: No upper extremity supported, Feet supported  Dynamic Sitting-Level of Assistance: Distant supervision  Dynamic Sitting-Balance:  (seated UE and LE MMT)  Dynamic Sitting-Comments: on EOB    Static Standing Balance  Static Standing-Balance Support: No upper extremity supported  Static Standing-Level of Assistance: Distant supervision  Dynamic Standing Balance  Dynamic Standing-Balance Support: No upper extremity supported  Dynamic Standing-Level of Assistance: Close supervision  Dynamic Standing-Balance: Turning  Functional Assessments:  Bed Mobility  Bed Mobility: Yes  Bed Mobility 1  Bed Mobility 1: Supine to sitting, Sitting to supine  Level of Assistance 1: Modified independent  Bed Mobility Comments 1: HOB elevated    Transfers  Transfer: Yes  Transfer 1  Transfer From 1: Bed to  Transfer to 1: Stand, Sit  Technique 1: Sit to stand, Stand to sit  Transfer Level of Assistance 1: Distant supervision    Ambulation/Gait Training  Ambulation/Gait Training Performed: Yes  Ambulation/Gait Training 1  Surface 1: Level tile  Device 1: No device  Assistance 1: Close supervision  Quality of Gait 1:  (decreased michael but otherwise WFL; no LOB or sway)  Comments/Distance (ft) 1: 20'x2  Extremity/Trunk Assessments:  RUE   RUE : Within Functional Limits  LUE   LUE: Within Functional Limits  RLE   RLE : Within Functional Limits  LLE   LLE : Within Functional Limits  Outcome Measures:  [facility] Basic Mobility  Turning from your back to your side while in a flat bed without using bedrails: None  Moving from lying on your back to sitting on the side  of a flat bed without using bedrails: None  Moving to and from bed to chair (including a wheelchair): None  Standing up from a chair using your arms (e.g. wheelchair or bedside chair): None  To walk in hospital room: A little  Climbing 3-5 steps with railing: A little  Basic Mobility - Total Score: 22    Encounter Problems       Encounter Problems (Active)       Balance       Goal 1- Pt will complete Tinetti Gait and Balance Test.       Start:  06/23/25    Expected End:  07/07/25               Mobility       STG - Patient will ambulate >150' with no device independently on level surfaces.       Start:  06/23/25    Expected End:  07/07/25            STG - Patient will ambulate up and down a curb/step independently.        Start:  06/23/25    Expected End:  07/07/25               PT Transfers       STG - Patient will perform bed mobility independently from a flat bed.        Start:  06/23/25    Expected End:  07/07/25            STG - Patient will transfer sit to and from stand independently from EOB as well as chair with no arms.        Start:  06/23/25    Expected End:  07/07/25               Pain - Adult              Education Documentation  Mobility Training, taught by Rosemarie Espino PT at 6/23/2025  5:00 PM.  Learner: Patient  Readiness: Acceptance  Method: Explanation  Response: Verbalizes Understanding  Comment: Pt educated in role of PT and importance of OOB mobility in acute setting.    Education Comments  No comments found.

## 2025-06-23 NOTE — CARE PLAN
The patient's goals for the shift include      The clinical goals for the shift include pt safety    Over the shift, the patient did make progress toward the following goals.

## 2025-06-23 NOTE — H&P
"History Of Present Illness  Tammy Becerra is a 70 y.o. female with a PMH of HTN, HLD, DM2, obesity, PVD, hx of non-compliance presenting with fall.  Ms Becerra has been having a dry cough for the past 4 days, no fever or shaking chills, with decrease in appetite with these symptoms. Has not been eating well during this time, continued to take her chlorthalidone.   Today she was walking out of the bathroom, legs felt \"wobbly\" and she fell to the floor landing on her left hip. Did not hit he head or lose consciousness.   Had pain in her left hip, but was able to get off the floor and ambulate.   No SOB, chest pain or dizziness prior to the fall.   Denies any recent N/V/D.     In the ED Xray hip showed no fracture.   She was found to be hypokalemic with a K of 3.0. Mag 1.66, Cr elevated at 2.61 (baseline is 1.27).   She is receiving potassium 60mEQ in the ED    Past Medical History  Medical History[1]    Surgical History  Surgical History[2]     Social History  She reports that she has never smoked. She has never used smokeless tobacco. She reports current drug use. Drug: Marijuana. She reports that she does not drink alcohol.    Family History  Family History[3]     Allergies  Patient has no known allergies.    Review of Systems   Constitutional:  Positive for appetite change.        Denies fever and chills   HENT: Negative.     Eyes: Negative.    Respiratory:  Positive for cough.         Dry cough x 4 days.   No SOB   Cardiovascular: Negative.         Denies CP or SOB.    Gastrointestinal: Negative.         No GI symptoms   Genitourinary: Negative.    Musculoskeletal: Negative.         Left hip pain   Skin: Negative.    Allergic/Immunologic: Negative.    Neurological: Negative.    Hematological: Negative.    Psychiatric/Behavioral: Negative.          Physical Exam  Vitals reviewed.   Constitutional:       Appearance: Normal appearance.      Comments: Pleasant middle aged female, alert, oriented, no distress. " "   with her in the ED   HENT:      Head: Normocephalic and atraumatic.      Mouth/Throat:      Mouth: Mucous membranes are moist.   Eyes:      Extraocular Movements: Extraocular movements intact.      Conjunctiva/sclera: Conjunctivae normal.      Pupils: Pupils are equal, round, and reactive to light.   Cardiovascular:      Rate and Rhythm: Normal rate and regular rhythm.      Pulses: Normal pulses.      Heart sounds: Normal heart sounds.   Pulmonary:      Effort: Pulmonary effort is normal.      Breath sounds: Normal breath sounds.      Comments: Lungs clear no W/R/R   Abdominal:      General: Abdomen is flat. Bowel sounds are normal.      Palpations: Abdomen is soft.   Musculoskeletal:         General: Normal range of motion.      Comments: No edema of left hip, no bruising seen.    Skin:     General: Skin is warm and dry.      Comments: Large lipoma along lower edge of scapula on the left. It is mobile, soft and non-tender.    Neurological:      General: No focal deficit present.      Mental Status: She is alert and oriented to person, place, and time.   Psychiatric:         Mood and Affect: Mood normal.         Behavior: Behavior normal.         Thought Content: Thought content normal.         Judgment: Judgment normal.          Last Recorded Vitals  Blood pressure 125/67, pulse 69, temperature 36.7 °C (98.1 °F), temperature source Temporal, resp. rate 20, height 1.702 m (5' 7\"), weight 83.9 kg (185 lb), SpO2 100%.    Relevant Results        Results for orders placed or performed during the hospital encounter of 06/22/25 (from the past 24 hours)   Basic metabolic panel   Result Value Ref Range    Glucose 188 (H) 74 - 99 mg/dL    Sodium 133 (L) 136 - 145 mmol/L    Potassium 3.3 (L) 3.5 - 5.3 mmol/L    Chloride 96 (L) 98 - 107 mmol/L    Bicarbonate 25 21 - 32 mmol/L    Anion Gap 15 10 - 20 mmol/L    Urea Nitrogen 36 (H) 6 - 23 mg/dL    Creatinine 2.61 (H) 0.50 - 1.05 mg/dL    eGFR 19 (L) >60 mL/min/1.73m*2 "    Calcium 8.2 (L) 8.6 - 10.3 mg/dL   Magnesium   Result Value Ref Range    Magnesium 1.66 1.60 - 2.40 mg/dL   CBC and Auto Differential   Result Value Ref Range    WBC 5.7 4.4 - 11.3 x10*3/uL    nRBC 0.0 0.0 - 0.0 /100 WBCs    RBC 4.63 4.00 - 5.20 x10*6/uL    Hemoglobin 12.5 12.0 - 16.0 g/dL    Hematocrit 38.8 36.0 - 46.0 %    MCV 84 80 - 100 fL    MCH 27.0 26.0 - 34.0 pg    MCHC 32.2 32.0 - 36.0 g/dL    RDW 12.7 11.5 - 14.5 %    Platelets      Neutrophils % 65.5 40.0 - 80.0 %    Immature Granulocytes %, Automated 0.4 0.0 - 0.9 %    Lymphocytes % 22.8 13.0 - 44.0 %    Monocytes % 10.6 2.0 - 10.0 %    Eosinophils % 0.2 0.0 - 6.0 %    Basophils % 0.5 0.0 - 2.0 %    Neutrophils Absolute 3.72 1.20 - 7.70 x10*3/uL    Immature Granulocytes Absolute, Automated 0.02 0.00 - 0.70 x10*3/uL    Lymphocytes Absolute 1.29 1.20 - 4.80 x10*3/uL    Monocytes Absolute 0.60 0.10 - 1.00 x10*3/uL    Eosinophils Absolute 0.01 0.00 - 0.70 x10*3/uL    Basophils Absolute 0.03 0.00 - 0.10 x10*3/uL   Potassium   Result Value Ref Range    Potassium 3.0 (L) 3.5 - 5.3 mmol/L   POCT GLUCOSE   Result Value Ref Range    POCT Glucose 62 (L) 74 - 99 mg/dL     XR hip left with pelvis when performed 2 or 3 views  Result Date: 6/22/2025  Interpreted By:  Sunil Michael, STUDY: XR HIP LEFT WITH PELVIS WHEN PERFORMED 2 OR 3 VIEWS; ;  6/22/2025 4:42 pm   INDICATION: Signs/Symptoms:left hip pain.     COMPARISON: None.   ACCESSION NUMBER(S): [id number]   ORDERING CLINICIAN: MATT BLACKMON   FINDINGS: There is no fracture. There is no dislocation. There are no degenerative changes. There is no lytic or sclerotic lesion. There is no soft tissue abnormality seen.       No acute abnormality in the left hip   MACRO: None   Signed by: Sunil Michael 6/22/2025 4:48 PM Dictation workstation:   [id number]         Assessment & Plan  Fall, initial encounter  - landed on left hip  - did not hit head, no LOC  - pain management of left hip pain  - likely due to  dec po intake, hypokalemia  - replete K  - up with assistance tonight    Hypokalemia  - will order ECG  - K of 3.0, receiving 60mEQ in the ED, will recheck in am  - telemetry  - hold Chlorthalidone    Hypomagnesemia  - giving 1g mag due to MONO  - recheck in am  - likely responsible for toe cramping    MONO  - Dec po intake with pt continuing diuretic  - Hold lisinopril and chlorthalidone  - IVF  - recheck in am    Cough  - dry  - no leukocytosis, afebrile normal breath sounds, unlikely to be pneumonia  - viral respiratory panel negative  - CXR  - Robitussin    DM2  - continue Lantus  - ISS  - HbA1c ordered    PVD  - continue ASA, plavix and statin    HTN  - BP controlled  - amlodipine  - hold chlorthalidone and lisinopril    HLD  - statin    Code status  - FULL      I spent 65 minutes in the professional and overall care of this patient.      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     Mixed hearing loss of left ear    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    Other conditions influencing health status 12/17/2020    Diabetes type 2, uncontrolled    Other conditions influencing health status     Uncontrolled type 2 diabetes mellitus    Overweight     Patient overweight    Patient's noncompliance with other medical treatment and regimen due to unspecified reason     Non compliance with medical treatment    Personal history of other diseases of the  circulatory system     History of hypertension    Personal history of other endocrine, nutritional and metabolic disease     History of type 2 diabetes mellitus    Personal history of other endocrine, nutritional and metabolic disease 03/12/2019    History of type 2 diabetes mellitus    Personal history of other specified conditions 06/08/2021    History of nausea and vomiting    Unspecified sensorineural hearing loss     Sensorineural hearing loss of right ear   [2]   Past Surgical History:  Procedure Laterality Date    IR INTERVENTION REVASCULARIZATION Bilateral 12/23/2019    IR INTERVENTION REVASCULARIZATION 12/23/2019 [facility] LEGAC[facility]    MR HEAD ANGIO WO IV CONTRAST  10/4/2019    MR HEAD ANGIO WO IV CONTRAST 10/4/2019 [facility] CLINICAL LEGAC[facility]    MR NECK ANGIO WO IV CONTRAST  10/4/2019    MR NECK ANGIO WO IV CONTRAST 10/4/2019 [facility] CLINICAL LEGACY    TUBAL LIGATION  06/28/2018    Tubal Ligation   [3]   Family History  Problem Relation Name Age of Onset    Hypertension Mother      Hyperlipidemia Mother      Thyroid disease Mother      Diabetes Father      Hypertension Father      Hyperlipidemia Father

## 2025-06-23 NOTE — PROGRESS NOTES
Pharmacy Medication History   spoke to the patient. No med changes.  Lantus is 25 units at bedtime    Source of Information:     Additional concerns with the patient's PTA list.     Notified Provider via Haiku : No    The following updates were made to the Prior to Admission medication list:     Medications ADDED:     Medications CHANGED:    Medications REMOVED:     Medications NOT TAKING:       Allergy reviewed : Yes    Meds 2 Beds : Yes    Outpatient pharmacy confirmed and updated in chart : Yes    [facility]    The list below reflectives the updated PTA list. Please review each medication in order reconciliation for additional clarification and justification.    Prior to Admission Medications   Prescriptions Last Dose   amLODIPine (Norvasc) 10 mg tablet 6/22/2025 at  9:00 AM   Sig: Take 1 tablet (10 mg) by mouth once daily.   aspirin 81 mg EC tablet 6/22/2025 at  9:00 AM   Sig: Take 1 tablet (81 mg) by mouth once daily.   chlorthalidone (Hygroton) 50 mg tablet 6/21/2025 at  9:00 AM   Sig: Take 1 tablet (50 mg) by mouth once daily.   cholecalciferol (Vitamin D-3) 50 mcg (2,000 units) capsule 6/22/2025 at  9:00 AM   Sig: TAKE 1 CAPSULE (50mcg) BY MOUTH ONCE DAILY   clopidogrel (Plavix) 75 mg tablet 6/22/2025 at  9:00 AM   Sig: Take 1 tablet (75 mg) by mouth once daily.   glipiZIDE (Glucotrol) 10 mg tablet Past Week   Sig: Take 1 tablet (10 mg) by mouth 2 times a day.   insulin glargine (Lantus Solostar U-100 Insulin) 100 unit/mL (3 mL) pen 6/21/2025 at  9:00 PM   Sig: Inject 100 Units under the skin once daily at bedtime. Take as directed per insulin instructions.   Patient taking differently: Inject 25 Units under the skin once daily at bedtime. Take as directed per insulin instructions.   insulin lispro (HumaLOG) 100 unit/mL pen 6/22/2025 at  5:00 PM   Sig: Inject per sliding scale three times daily - 200-250 take 2 units, 251-300 take 3 units, 301-350 take 4 units, 351-400 take 5 units    lisinopril 40 mg tablet 6/22/2025 at  9:00 AM   Sig: Take 1 tablet (40 mg) by mouth once daily.   omeprazole (PriLOSEC) 20 mg DR capsule    Sig: Take 1 capsule (20 mg) by mouth once daily. Do not crush or chew.   rosuvastatin (Crestor) 20 mg tablet 6/22/2025 at  9:00 AM   Sig: Take 1 tablet (20 mg) by mouth once daily.      Facility-Administered Medications Last Administration Doses Remaining   acetaminophen (Tylenol) tablet 650 mg 6/22/2025  2:41 PM 0   ondansetron ODT (Zofran-ODT) disintegrating tablet 4 mg 6/22/2025  2:41 PM 0          The list below reflectives the updated allergy list. Please review each documented allergy for additional clarification and justification.    No Known Allergies       06/23/25 at 10:10 AM - Cassidy Rodriguez

## 2025-06-23 NOTE — ASSESSMENT & PLAN NOTE
MONO ( improving)   - Dec po intake with pt continuing diuretic  - Hold lisinopril and chlorthalidone  - gave additional IVF today for prerenal MONO   - recheck in am    - landed on left hip  - did not hit head, no LOC  - pain management of left hip pain  - likely due to dec po intake, hypokalemia  - replete K  - up with assistance tonight    Hypokalemia  - will order ECG  - K of 3.0, receiving 60mEQ in the ED, will recheck in am  - telemetry  - hold Chlorthalidone    Hypomagnesemia  - giving 1g mag due to MONO  - recheck in am  - likely responsible for toe cramping      Cough  - dry  - no leukocytosis, afebrile normal breath sounds, unlikely to be pneumonia  - viral respiratory panel negative  - CXR  - Robitussin    DM2  - continue Lantus  - ISS  - HbA1c ordered    PVD  - continue ASA, plavix and statin    HTN  - BP controlled  - amlodipine  - hold chlorthalidone and lisinopril    HLD  - statin  Dispo  -awaiting PT/OT. Most likely discharge home tomorrow after further improvement in MONO     Code status  - FULL

## 2025-06-23 NOTE — PROGRESS NOTES
Tammy Becerra is a 70 y.o. female on day 1 of admission presenting with Fall, initial encounter.      Subjective   No acute events overnight. Patient does not have any complaints today. States that he pain is well controlled.        Objective     Last Recorded Vitals  /60 (BP Location: Left arm, Patient Position: Lying)   Pulse 66   Temp 36.7 °C (98 °F) (Oral)   Resp 20   Wt 74.5 kg (164 lb 3.2 oz)   SpO2 94%   Intake/Output last 3 Shifts:    Intake/Output Summary (Last 24 hours) at 6/23/2025 1445  Last data filed at 6/23/2025 0900  Gross per 24 hour   Intake 2188 ml   Output 150 ml   Net 2038 ml       Admission Weight  Weight: 83.9 kg (185 lb) (06/22/25 1525)    Daily Weight  06/22/25 : 74.5 kg (164 lb 3.2 oz)    Image Results  XR chest 1 view  Narrative: Interpreted By:  Serina Fuentes,   STUDY:  XR CHEST 1 VIEW;  6/22/2025 10:30 pm      INDICATION:  Signs/Symptoms:cough x 3 days          COMPARISON:  Chest x-ray 04/25/2022      ACCESSION NUMBER(S):  [id number]      ORDERING CLINICIAN:  RUPERTO PRINGLE      TECHNIQUE:  Portable upright frontal view of the chest was obtained .      FINDINGS:  The cardiomediastinal silhouette is within normal limits.  Atherosclerotic calcifications are noted at the aortic arch.      No focal consolidation, pleural effusion or pneumothorax.      Impression: 1.  No radiographic evidence of acute cardiopulmonary process.              MACRO:  None.      Signed by: Serina Fuentes 6/22/2025 11:12 PM  Dictation workstation:   [id number]  XR hip left with pelvis when performed 2 or 3 views  Narrative: Interpreted By:  Sunil Michael,   STUDY:  XR HIP LEFT WITH PELVIS WHEN PERFORMED 2 OR 3 VIEWS; ;  6/22/2025  4:42 pm      INDICATION:  Signs/Symptoms:left hip pain.          COMPARISON:  None.      ACCESSION NUMBER(S):  [id number]      ORDERING CLINICIAN:  MATT BLACKMON      FINDINGS:  There is no fracture. There is no dislocation. There are no  degenerative changes.  There is no lytic or sclerotic lesion. There is  no soft tissue abnormality seen.      Impression: No acute abnormality in the left hip      MACRO:  None      Signed by: Sunil Michael 6/22/2025 4:48 PM  Dictation workstation:   [id number]      Physical Exam  Vitals reviewed.   Constitutional:       Appearance: Normal appearance.      Comments: Pleasant middle aged female, alert, oriented, no distress.    with her in the ED   HENT:      Head: Normocephalic and atraumatic.      Mouth/Throat:      Mouth: Mucous membranes are moist.   Eyes:      Extraocular Movements: Extraocular movements intact.      Conjunctiva/sclera: Conjunctivae normal.      Pupils: Pupils are equal, round, and reactive to light.   Cardiovascular:      Rate and Rhythm: Normal rate and regular rhythm.      Pulses: Normal pulses.      Heart sounds: Normal heart sounds.   Pulmonary:      Effort: Pulmonary effort is normal.      Breath sounds: Normal breath sounds.      Comments: Lungs clear no W/R/R   Abdominal:      General: Abdomen is flat. Bowel sounds are normal.      Palpations: Abdomen is soft.   Musculoskeletal:         General: Normal range of motion.      Comments: No edema of left hip, no bruising seen.    Skin:     General: Skin is warm and dry.      Comments: Large lipoma along lower edge of scapula on the left. It is mobile, soft and non-tender.    Neurological:      General: No focal deficit present.      Mental Status: She is alert and oriented to person, place, and time.   Psychiatric:         Mood and Affect: Mood normal.         Behavior: Behavior normal.         Thought Content: Thought content normal.         Judgment: Judgment normal.   Relevant Results                              Assessment & Plan  Fall, initial encounter  MONO ( improving)   - Dec po intake with pt continuing diuretic  - Hold lisinopril and chlorthalidone  - gave additional IVF today for prerenal MONO   - recheck in am    - landed on left hip  - did  not hit head, no LOC  - pain management of left hip pain  - likely due to dec po intake, hypokalemia  - replete K  - up with assistance tonight    Hypokalemia  - will order ECG  - K of 3.0, receiving 60mEQ in the ED, will recheck in am  - telemetry  - hold Chlorthalidone    Hypomagnesemia  - giving 1g mag due to MONO  - recheck in am  - likely responsible for toe cramping      Cough  - dry  - no leukocytosis, afebrile normal breath sounds, unlikely to be pneumonia  - viral respiratory panel negative  - CXR  - Robitussin    DM2  - continue Lantus  - ISS  - HbA1c ordered    PVD  - continue ASA, plavix and statin    HTN  - BP controlled  - amlodipine  - hold chlorthalidone and lisinopril    HLD  - statin  Dispo  -awaiting PT/OT. Most likely discharge home tomorrow after further improvement in MONO     Code status  - FULL             Re Wolf MD

## 2025-06-23 NOTE — ASSESSMENT & PLAN NOTE
- landed on left hip  - did not hit head, no LOC  - pain management of left hip pain  - likely due to dec po intake, hypokalemia  - replete K  - up with assistance tonight    Hypokalemia  - will order ECG  - K of 3.0, receiving 60mEQ in the ED, will recheck in am  - telemetry  - hold Chlorthalidone    Hypomagnesemia  - giving 1g mag due to MONO  - recheck in am  - likely responsible for toe cramping    MONO  - Dec po intake with pt continuing diuretic  - Hold lisinopril and chlorthalidone  - IVF  - recheck in am    Cough  - dry  - no leukocytosis, afebrile normal breath sounds, unlikely to be pneumonia  - viral respiratory panel negative  - CXR  - Robitussin    DM2  - continue Lantus  - ISS  - HbA1c ordered    PVD  - continue ASA, plavix and statin    HTN  - BP controlled  - amlodipine  - hold chlorthalidone and lisinopril    HLD  - statin    Code status  - FULL

## 2025-06-24 ENCOUNTER — PHARMACY VISIT (OUTPATIENT)
Dept: PHARMACY | Facility: CLINIC | Age: 70
End: 2025-06-24
Payer: COMMERCIAL

## 2025-06-24 ENCOUNTER — DOCUMENTATION (OUTPATIENT)
Dept: HOME HEALTH SERVICES | Facility: HOME HEALTH | Age: 70
End: 2025-06-24
Payer: MEDICARE

## 2025-06-24 VITALS
SYSTOLIC BLOOD PRESSURE: 131 MMHG | OXYGEN SATURATION: 97 % | HEIGHT: 67 IN | BODY MASS INDEX: 25.77 KG/M2 | HEART RATE: 71 BPM | TEMPERATURE: 98.1 F | RESPIRATION RATE: 22 BRPM | DIASTOLIC BLOOD PRESSURE: 70 MMHG | WEIGHT: 164.2 LBS

## 2025-06-24 DIAGNOSIS — E11.9 DIABETES MELLITUS TYPE 2 WITHOUT RETINOPATHY (MULTI): ICD-10-CM

## 2025-06-24 LAB
ALBUMIN SERPL BCP-MCNC: 3.1 G/DL (ref 3.4–5)
ALP SERPL-CCNC: 45 U/L (ref 33–136)
ALT SERPL W P-5'-P-CCNC: 10 U/L (ref 7–45)
ANION GAP SERPL CALC-SCNC: 10 MMOL/L (ref 10–20)
AST SERPL W P-5'-P-CCNC: 14 U/L (ref 9–39)
BILIRUB SERPL-MCNC: 0.4 MG/DL (ref 0–1.2)
BUN SERPL-MCNC: 32 MG/DL (ref 6–23)
CALCIUM SERPL-MCNC: 8.1 MG/DL (ref 8.6–10.3)
CHLORIDE SERPL-SCNC: 106 MMOL/L (ref 98–107)
CO2 SERPL-SCNC: 26 MMOL/L (ref 21–32)
CREAT SERPL-MCNC: 1.39 MG/DL (ref 0.5–1.05)
EGFRCR SERPLBLD CKD-EPI 2021: 41 ML/MIN/1.73M*2
GLUCOSE BLD MANUAL STRIP-MCNC: 126 MG/DL (ref 74–99)
GLUCOSE BLD MANUAL STRIP-MCNC: 144 MG/DL (ref 74–99)
GLUCOSE SERPL-MCNC: 111 MG/DL (ref 74–99)
HOLD SPECIMEN: NORMAL
POTASSIUM SERPL-SCNC: 3.7 MMOL/L (ref 3.5–5.3)
PROT SERPL-MCNC: 5.8 G/DL (ref 6.4–8.2)
SODIUM SERPL-SCNC: 138 MMOL/L (ref 136–145)

## 2025-06-24 PROCEDURE — 2500000002 HC RX 250 W HCPCS SELF ADMINISTERED DRUGS (ALT 637 FOR MEDICARE OP, ALT 636 FOR OP/ED): Performed by: HOSPITALIST

## 2025-06-24 PROCEDURE — 36415 COLL VENOUS BLD VENIPUNCTURE: CPT | Performed by: STUDENT IN AN ORGANIZED HEALTH CARE EDUCATION/TRAINING PROGRAM

## 2025-06-24 PROCEDURE — 82947 ASSAY GLUCOSE BLOOD QUANT: CPT

## 2025-06-24 PROCEDURE — 2500000001 HC RX 250 WO HCPCS SELF ADMINISTERED DRUGS (ALT 637 FOR MEDICARE OP): Performed by: HOSPITALIST

## 2025-06-24 PROCEDURE — 84075 ASSAY ALKALINE PHOSPHATASE: CPT | Performed by: STUDENT IN AN ORGANIZED HEALTH CARE EDUCATION/TRAINING PROGRAM

## 2025-06-24 PROCEDURE — RXMED WILLOW AMBULATORY MEDICATION CHARGE

## 2025-06-24 PROCEDURE — 99239 HOSP IP/OBS DSCHRG MGMT >30: CPT | Performed by: STUDENT IN AN ORGANIZED HEALTH CARE EDUCATION/TRAINING PROGRAM

## 2025-06-24 RX ORDER — GUAIFENESIN 100 MG/5ML
200 LIQUID ORAL EVERY 4 HOURS PRN
Qty: 120 ML | Refills: 0 | Status: SHIPPED | OUTPATIENT
Start: 2025-06-24

## 2025-06-24 RX ORDER — OXYCODONE HYDROCHLORIDE 5 MG/1
5 TABLET ORAL EVERY 4 HOURS PRN
Qty: 15 TABLET | Refills: 0 | Status: SHIPPED | OUTPATIENT
Start: 2025-06-24 | End: 2025-06-27

## 2025-06-24 RX ADMIN — PANTOPRAZOLE SODIUM 40 MG: 40 TABLET, DELAYED RELEASE ORAL at 06:07

## 2025-06-24 RX ADMIN — GUAIFENESIN 200 MG: 200 SOLUTION ORAL at 09:50

## 2025-06-24 RX ADMIN — Medication 50 MCG: at 06:07

## 2025-06-24 RX ADMIN — AMLODIPINE BESYLATE 10 MG: 10 TABLET ORAL at 09:32

## 2025-06-24 RX ADMIN — ASPIRIN 81 MG: 81 TABLET, COATED ORAL at 09:32

## 2025-06-24 RX ADMIN — CLOPIDOGREL 75 MG: 75 TABLET ORAL at 09:32

## 2025-06-24 RX ADMIN — ROSUVASTATIN 20 MG: 20 TABLET, FILM COATED ORAL at 09:32

## 2025-06-24 ASSESSMENT — COGNITIVE AND FUNCTIONAL STATUS - GENERAL
DAILY ACTIVITIY SCORE: 22
MOVING TO AND FROM BED TO CHAIR: A LITTLE
MOBILITY SCORE: 20
WALKING IN HOSPITAL ROOM: A LITTLE
HELP NEEDED FOR BATHING: A LITTLE
CLIMB 3 TO 5 STEPS WITH RAILING: A LITTLE
DRESSING REGULAR LOWER BODY CLOTHING: A LITTLE
STANDING UP FROM CHAIR USING ARMS: A LITTLE

## 2025-06-24 ASSESSMENT — PAIN SCALES - GENERAL
PAINLEVEL_OUTOF10: 0 - NO PAIN

## 2025-06-24 ASSESSMENT — PAIN - FUNCTIONAL ASSESSMENT
PAIN_FUNCTIONAL_ASSESSMENT: 0-10

## 2025-06-24 NOTE — HH CARE COORDINATION
Home Care received a referral for Physical Therapy and Occupational Therapy. Unfortunately, we are unable to accept and process the referral at this time.    Reason:  Patient Declines Home Care     Patients, please reach out to the referring provider or your PCP to assist in obtaining an alternative home care agency and/or guidance to meet your needs.    Providers, please reach out to  Home Care at [phone] with any questions regarding the declined referral.

## 2025-06-24 NOTE — DISCHARGE SUMMARY
Discharge Diagnosis  Fall, initial encounter       Issues Requiring Follow-Up  PCP follow up     Discharge Meds     Medication List      START taking these medications     Chest Congestion Relief 100 mg/5 mL syrup; Generic drug: guaiFENesin;   Take 10 mL (200 mg) by mouth every 4 hours if needed for congestion.   oxyCODONE 5 mg immediate release tablet; Commonly known as: Roxicodone;   Take 1 tablet (5 mg) by mouth every 4 hours if needed for moderate pain (4   - 6) for up to 3 days.     CONTINUE taking these medications     amLODIPine 10 mg tablet; Commonly known as: Norvasc; Take 1 tablet (10   mg) by mouth once daily.   aspirin 81 mg EC tablet   chlorthalidone 50 mg tablet; Commonly known as: [place]; Take 1 tablet   (50 mg) by mouth once daily.   cholecalciferol 50 mcg (2,000 units) capsule; Commonly known as: Vitamin   D-3; TAKE 1 CAPSULE (50mcg) BY MOUTH ONCE DAILY   clopidogrel 75 mg tablet; Commonly known as: Plavix; Take 1 tablet (75   mg) by mouth once daily.   glipiZIDE 10 mg tablet; Commonly known as: Glucotrol; Take 1 tablet (10   mg) by mouth 2 times a day.   insulin lispro 100 unit/mL pen; Commonly known as: HumaLOG; Inject per   sliding scale three times daily - 200-250 take 2 units, 251-300 take 3   units, 301-350 take 4 units, 351-400 take 5 units   Lantus Solostar U-100 Insulin 100 unit/mL (3 mL) pen; Generic drug:   insulin glargine; Inject 100 Units under the skin once daily at bedtime.   Take as directed per insulin instructions.   lisinopril 40 mg tablet; Take 1 tablet (40 mg) by mouth once daily.   omeprazole 20 mg DR capsule; Commonly known as: PriLOSEC; Take 1 capsule   (20 mg) by mouth once daily. Do not crush or chew.   rosuvastatin 20 mg tablet; Commonly known as: Crestor; Take 1 tablet (20   mg) by mouth once daily.       Test Results Pending At Discharge  Pending Labs       No current pending labs.            Hospital Course   Tammy Becerra is a 70 y.o. female with a PMH of HTN, HLD,  "DM2, obesity, PVD, hx of non-compliance presenting with fall.  Ms Hernan has been having a dry cough for the past 4 days, no fever or shaking chills, with decrease in appetite with these symptoms. Has not been eating well during this time, continued to take her chlorthalidone.   Today she was walking out of the bathroom, legs felt \"wobbly\" and she fell to the floor landing on her left hip. Did not hit he head or lose consciousness.   Had pain in her left hip, but was able to get off the floor and ambulate.   No SOB, chest pain or dizziness prior to the fall.   Denies any recent N/V/D.      In the ED Xray hip showed no fracture.   She was found to be hypokalemic with a K of 3.0. Mag 1.66, Cr elevated at 2.61 (baseline is 1.27).   She is receiving potassium 60mEQ in the ED. MONO was resolved with 2L of LR. Patient was also repleted. Patient was seen by PT/OT and was rec'ed low. Patient felt stronger with ambulation after hydration. Plan for patient to follow up with PCP     Pertinent Physical Exam At Time of Discharge  Physical Exam  Vitals reviewed.   Constitutional:       Appearance: Normal appearance.      Comments: Pleasant middle aged female, alert, oriented, no distress.    with her in the ED   HENT:      Head: Normocephalic and atraumatic.      Mouth/Throat:      Mouth: Mucous membranes are moist.   Eyes:      Extraocular Movements: Extraocular movements intact.      Conjunctiva/sclera: Conjunctivae normal.      Pupils: Pupils are equal, round, and reactive to light.   Cardiovascular:      Rate and Rhythm: Normal rate and regular rhythm.      Pulses: Normal pulses.      Heart sounds: Normal heart sounds.   Pulmonary:      Effort: Pulmonary effort is normal.      Breath sounds: Normal breath sounds.      Comments: Lungs clear no W/R/R   Abdominal:      General: Abdomen is flat. Bowel sounds are normal.      Palpations: Abdomen is soft.   Musculoskeletal:         General: Normal range of motion.      " Comments: No edema of left hip, no bruising seen.    Skin:     General: Skin is warm and dry.      Comments: Large lipoma along lower edge of scapula on the left. It is mobile, soft and non-tender.    Neurological:      General: No focal deficit present.      Mental Status: She is alert and oriented to person, place, and time.   Psychiatric:         Mood and Affect: Mood normal.         Behavior: Behavior normal.         Thought Content: Thought content normal.         Judgment: Judgment normal.   Outpatient Follow-Up  Future Appointments   Date Time Provider Department Center   7/24/2025 10:00 AM [id number]   6/2/2026  9:00 AM Nivia Carter MD [id number] [facility]         Re Wolf MD

## 2025-06-24 NOTE — CARE PLAN
The patient's goals for the shift include having a restful night.    The clinical goals for the shift include maintaining safety and providing comfort measures.    Over the shift, the patient did not make progress toward the following goals. Barriers to progression include n/a. Recommendations to address these barriers include n/a.

## 2025-06-24 NOTE — PROGRESS NOTES
06/24/25 0955   Discharge Planning   Home or Post Acute Services None   Expected Discharge Disposition Home     Pt declines need for HHC at this time. Will dc [facility].

## 2025-06-24 NOTE — CARE PLAN
The patient's goals for the shift include  no falls    The clinical goals for the shift include maintaining safety and providing comfort measures.      Problem: Pain - Adult  Goal: Verbalizes/displays adequate comfort level or baseline comfort level  Outcome: Progressing     Problem: Safety - Adult  Goal: Free from fall injury  Outcome: Progressing     Problem: Discharge Planning  Goal: Discharge to home or other facility with appropriate resources  Outcome: Met     Problem: Chronic Conditions and Co-morbidities  Goal: Patient's chronic conditions and co-morbidity symptoms are monitored and maintained or improved  Outcome: Progressing     Problem: Nutrition  Goal: Nutrient intake appropriate for maintaining nutritional needs  Outcome: Progressing     Problem: Respiratory  Goal: No signs of respiratory distress (eg. Use of accessory muscles. Peds grunting)  Outcome: Progressing

## 2025-06-24 NOTE — DISCHARGE INSTRUCTIONS
Ms. Becerra it was a pleasure taking care of you.   You were admitted for a fall most likely in the setting of imbalance and dehydration. Please work with physical therapy at home.  Best,  Your Internal Medicine Team

## 2025-06-24 NOTE — PROGRESS NOTES
Occupational Therapy                 Therapy Communication Note    Patient Name: Tammy Becerra  MRN: [id number]  Department: [place] [address]  Room: [address] [place] [address]  Today's Date: 6/24/2025     Discipline: Occupational Therapy    Missed Visit:       Missed Visit Reason: Missed Visit Reason: Other (Comment) (Orders received, chart reviewed. OT screen completed. Pt at functional baseline. No acute inpt OT needs. Defer d/c recommendation to primary team)

## 2025-06-26 ENCOUNTER — PATIENT OUTREACH (OUTPATIENT)
Dept: CARE COORDINATION | Age: 70
End: 2025-06-26
Payer: MEDICARE

## 2025-06-26 ENCOUNTER — PATIENT OUTREACH (OUTPATIENT)
Dept: CARE COORDINATION | Facility: CLINIC | Age: 70
End: 2025-06-26
Payer: MEDICARE

## 2025-06-26 NOTE — PROGRESS NOTES
Outreach call to patient to support a smooth transition of care from recent admission. Patient is signed up with [facility]. No further outreach at this time.  Left voicemail message for patient with my contact information.  ana CALVO, RN, [facility]  O: [phone]

## 2025-07-02 DIAGNOSIS — I63.9 CEREBELLAR INFARCTION (MULTI): ICD-10-CM

## 2025-07-02 DIAGNOSIS — E11.65 UNCONTROLLED TYPE 2 DIABETES MELLITUS WITH HYPERGLYCEMIA: Primary | ICD-10-CM

## 2025-07-10 ENCOUNTER — PATIENT OUTREACH (OUTPATIENT)
Dept: CARE COORDINATION | Facility: CLINIC | Age: 70
End: 2025-07-10
Payer: MEDICARE

## 2025-07-10 NOTE — PROGRESS NOTES
Attempted outreach to assist in making a follow up appointment.   Left a voice mail with my contact information.   Will continue to follow.  Bridger CALVO, RN, [facility]  O: [phone]

## 2025-07-24 ENCOUNTER — PATIENT OUTREACH (OUTPATIENT)
Dept: CARE COORDINATION | Facility: CLINIC | Age: 70
End: 2025-07-24

## 2025-07-24 ENCOUNTER — APPOINTMENT (OUTPATIENT)
Dept: RADIOLOGY | Facility: CLINIC | Age: 70
End: 2025-07-24
Payer: MEDICARE

## 2025-07-24 NOTE — PROGRESS NOTES
Attempted outreach call to patient to check in 30 days after hospital discharge to support smooth transition of care.  Left a voice message with my contact information.  No additional outreach needed at this time.    ana CALVO, RN, [facility]  O: [phone]

## 2025-08-04 ENCOUNTER — APPOINTMENT (OUTPATIENT)
Dept: RADIOLOGY | Facility: CLINIC | Age: 70
End: 2025-08-04
Payer: MEDICARE

## 2025-09-25 ENCOUNTER — APPOINTMENT (OUTPATIENT)
Dept: RADIOLOGY | Facility: CLINIC | Age: 70
End: 2025-09-25
Payer: MEDICARE

## 2026-06-02 ENCOUNTER — APPOINTMENT (OUTPATIENT)
Dept: OPHTHALMOLOGY | Facility: CLINIC | Age: 71
End: 2026-06-02
Payer: MEDICARE